# Patient Record
Sex: MALE | Race: WHITE | NOT HISPANIC OR LATINO | Employment: FULL TIME | URBAN - METROPOLITAN AREA
[De-identification: names, ages, dates, MRNs, and addresses within clinical notes are randomized per-mention and may not be internally consistent; named-entity substitution may affect disease eponyms.]

---

## 2017-07-19 ENCOUNTER — COMMUNICATION - HEALTHEAST (OUTPATIENT)
Dept: FAMILY MEDICINE | Facility: CLINIC | Age: 32
End: 2017-07-19

## 2017-07-19 DIAGNOSIS — F41.1 ANXIETY STATE: ICD-10-CM

## 2017-11-08 ENCOUNTER — COMMUNICATION - HEALTHEAST (OUTPATIENT)
Dept: FAMILY MEDICINE | Facility: CLINIC | Age: 32
End: 2017-11-08

## 2017-11-08 DIAGNOSIS — F41.1 ANXIETY STATE: ICD-10-CM

## 2018-05-01 ENCOUNTER — OFFICE VISIT - HEALTHEAST (OUTPATIENT)
Dept: FAMILY MEDICINE | Facility: CLINIC | Age: 33
End: 2018-05-01

## 2018-05-01 DIAGNOSIS — F41.1 ANXIETY STATE: ICD-10-CM

## 2018-05-01 DIAGNOSIS — Z80.8 FAMILY HX OF MELANOMA: ICD-10-CM

## 2018-05-01 DIAGNOSIS — K21.00 REFLUX ESOPHAGITIS: ICD-10-CM

## 2018-05-01 DIAGNOSIS — Z00.00 HEALTHCARE MAINTENANCE: ICD-10-CM

## 2018-05-01 LAB
CHOLEST SERPL-MCNC: 153 MG/DL
ERYTHROCYTE [DISTWIDTH] IN BLOOD BY AUTOMATED COUNT: 11.7 % (ref 11–14.5)
FASTING STATUS PATIENT QL REPORTED: YES
FASTING STATUS PATIENT QL REPORTED: YES
GLUCOSE BLD-MCNC: 106 MG/DL (ref 70–99)
HCT VFR BLD AUTO: 44.5 % (ref 40–54)
HDLC SERPL-MCNC: 34 MG/DL
HGB BLD-MCNC: 15 G/DL (ref 14–18)
LDLC SERPL CALC-MCNC: 107 MG/DL
MCH RBC QN AUTO: 30.1 PG (ref 27–34)
MCHC RBC AUTO-ENTMCNC: 33.8 G/DL (ref 32–36)
MCV RBC AUTO: 89 FL (ref 80–100)
PLATELET # BLD AUTO: 153 THOU/UL (ref 140–440)
PMV BLD AUTO: 9 FL (ref 7–10)
RBC # BLD AUTO: 5 MILL/UL (ref 4.4–6.2)
TRIGL SERPL-MCNC: 58 MG/DL
WBC: 6.1 THOU/UL (ref 4–11)

## 2018-05-01 ASSESSMENT — MIFFLIN-ST. JEOR: SCORE: 2067.51

## 2018-05-03 ENCOUNTER — COMMUNICATION - HEALTHEAST (OUTPATIENT)
Dept: FAMILY MEDICINE | Facility: CLINIC | Age: 33
End: 2018-05-03

## 2018-05-16 ENCOUNTER — COMMUNICATION - HEALTHEAST (OUTPATIENT)
Dept: ONCOLOGY | Facility: HOSPITAL | Age: 33
End: 2018-05-16

## 2019-01-08 ENCOUNTER — OFFICE VISIT - HEALTHEAST (OUTPATIENT)
Dept: FAMILY MEDICINE | Facility: CLINIC | Age: 34
End: 2019-01-08

## 2019-01-08 DIAGNOSIS — R05.9 COUGH: ICD-10-CM

## 2019-01-08 LAB
FLUAV AG SPEC QL IA: NORMAL
FLUBV AG SPEC QL IA: NORMAL

## 2019-01-08 ASSESSMENT — MIFFLIN-ST. JEOR: SCORE: 1999.48

## 2019-01-14 ENCOUNTER — RECORDS - HEALTHEAST (OUTPATIENT)
Dept: ADMINISTRATIVE | Facility: OTHER | Age: 34
End: 2019-01-14

## 2019-04-23 ENCOUNTER — COMMUNICATION - HEALTHEAST (OUTPATIENT)
Dept: FAMILY MEDICINE | Facility: CLINIC | Age: 34
End: 2019-04-23

## 2019-04-23 DIAGNOSIS — F41.1 ANXIETY STATE: ICD-10-CM

## 2019-05-07 ENCOUNTER — OFFICE VISIT - HEALTHEAST (OUTPATIENT)
Dept: FAMILY MEDICINE | Facility: CLINIC | Age: 34
End: 2019-05-07

## 2019-05-07 DIAGNOSIS — F41.1 ANXIETY STATE: ICD-10-CM

## 2019-05-07 DIAGNOSIS — Z00.00 ROUTINE GENERAL MEDICAL EXAMINATION AT A HEALTH CARE FACILITY: ICD-10-CM

## 2019-05-07 DIAGNOSIS — Z80.8 FAMILY HX OF MELANOMA: ICD-10-CM

## 2019-05-07 DIAGNOSIS — J02.9 ACUTE PHARYNGITIS, UNSPECIFIED ETIOLOGY: ICD-10-CM

## 2019-05-07 LAB
BASOPHILS # BLD AUTO: 0 THOU/UL (ref 0–0.2)
BASOPHILS NFR BLD AUTO: 0 % (ref 0–2)
CHOLEST SERPL-MCNC: 169 MG/DL
EOSINOPHIL # BLD AUTO: 0.1 THOU/UL (ref 0–0.4)
EOSINOPHIL NFR BLD AUTO: 2 % (ref 0–6)
ERYTHROCYTE [DISTWIDTH] IN BLOOD BY AUTOMATED COUNT: 11.6 % (ref 11–14.5)
FASTING STATUS PATIENT QL REPORTED: YES
FASTING STATUS PATIENT QL REPORTED: YES
GLUCOSE BLD-MCNC: 85 MG/DL (ref 70–99)
HCT VFR BLD AUTO: 45.1 % (ref 40–54)
HDLC SERPL-MCNC: 39 MG/DL
HGB BLD-MCNC: 14.9 G/DL (ref 14–18)
LDLC SERPL CALC-MCNC: 116 MG/DL
LYMPHOCYTES # BLD AUTO: 2 THOU/UL (ref 0.8–4.4)
LYMPHOCYTES NFR BLD AUTO: 36 % (ref 20–40)
MCH RBC QN AUTO: 29.3 PG (ref 27–34)
MCHC RBC AUTO-ENTMCNC: 33.1 G/DL (ref 32–36)
MCV RBC AUTO: 89 FL (ref 80–100)
MONOCYTES # BLD AUTO: 0.4 THOU/UL (ref 0–0.9)
MONOCYTES NFR BLD AUTO: 7 % (ref 2–10)
NEUTROPHILS # BLD AUTO: 3 THOU/UL (ref 2–7.7)
NEUTROPHILS NFR BLD AUTO: 55 % (ref 50–70)
PLATELET # BLD AUTO: 138 THOU/UL (ref 140–440)
PMV BLD AUTO: 8.8 FL (ref 7–10)
RBC # BLD AUTO: 5.09 MILL/UL (ref 4.4–6.2)
TRIGL SERPL-MCNC: 68 MG/DL
TSH SERPL DL<=0.005 MIU/L-ACNC: 1.99 UIU/ML (ref 0.3–5)
WBC: 5.5 THOU/UL (ref 4–11)

## 2019-05-07 ASSESSMENT — MIFFLIN-ST. JEOR: SCORE: 2017.9

## 2019-06-10 ENCOUNTER — OFFICE VISIT - HEALTHEAST (OUTPATIENT)
Dept: ONCOLOGY | Facility: HOSPITAL | Age: 34
End: 2019-06-10

## 2019-06-10 DIAGNOSIS — Z80.0 FAMILY HISTORY OF PANCREATIC CANCER: ICD-10-CM

## 2019-06-10 DIAGNOSIS — Z80.41 FAMILY HISTORY OF OVARIAN CANCER: ICD-10-CM

## 2019-06-10 DIAGNOSIS — Z80.0 FAMILY HISTORY OF COLON CANCER: ICD-10-CM

## 2019-06-10 DIAGNOSIS — Z80.8 FAMILY HISTORY OF MELANOMA: ICD-10-CM

## 2019-07-08 ENCOUNTER — COMMUNICATION - HEALTHEAST (OUTPATIENT)
Dept: ADMINISTRATIVE | Facility: HOSPITAL | Age: 34
End: 2019-07-08

## 2019-07-11 ENCOUNTER — COMMUNICATION - HEALTHEAST (OUTPATIENT)
Dept: ADMINISTRATIVE | Facility: HOSPITAL | Age: 34
End: 2019-07-11

## 2019-07-18 ENCOUNTER — COMMUNICATION - HEALTHEAST (OUTPATIENT)
Dept: ADMINISTRATIVE | Facility: HOSPITAL | Age: 34
End: 2019-07-18

## 2019-07-31 ENCOUNTER — OFFICE VISIT - HEALTHEAST (OUTPATIENT)
Dept: ONCOLOGY | Facility: HOSPITAL | Age: 34
End: 2019-07-31

## 2019-07-31 DIAGNOSIS — Z80.8 FAMILY HISTORY OF MELANOMA: ICD-10-CM

## 2019-07-31 DIAGNOSIS — Z80.0 FAMILY HISTORY OF PANCREATIC CANCER: ICD-10-CM

## 2019-07-31 DIAGNOSIS — Z80.0 FAMILY HISTORY OF COLON CANCER: ICD-10-CM

## 2019-07-31 DIAGNOSIS — Z80.41 FAMILY HISTORY OF OVARIAN CANCER: ICD-10-CM

## 2020-02-04 ENCOUNTER — COMMUNICATION - HEALTHEAST (OUTPATIENT)
Dept: ONCOLOGY | Facility: HOSPITAL | Age: 35
End: 2020-02-04

## 2020-02-04 DIAGNOSIS — Z15.03 MONOALLELIC MUTATION OF CHEK2 GENE IN MALE PATIENT: ICD-10-CM

## 2020-02-04 DIAGNOSIS — Z15.09 MONOALLELIC MUTATION OF CHEK2 GENE IN MALE PATIENT: ICD-10-CM

## 2020-02-04 DIAGNOSIS — Z15.01 MONOALLELIC MUTATION OF CHEK2 GENE IN MALE PATIENT: ICD-10-CM

## 2020-02-04 DIAGNOSIS — Z80.8 FAMILY HISTORY OF THYROID CANCER: ICD-10-CM

## 2020-02-04 DIAGNOSIS — Z15.89 MONOALLELIC MUTATION OF CHEK2 GENE IN MALE PATIENT: ICD-10-CM

## 2020-02-04 DIAGNOSIS — Z80.0 FAMILY HISTORY OF PANCREATIC CANCER: ICD-10-CM

## 2020-02-04 DIAGNOSIS — Z80.8 FAMILY HISTORY OF MELANOMA: ICD-10-CM

## 2020-02-07 ENCOUNTER — COMMUNICATION - HEALTHEAST (OUTPATIENT)
Dept: ADMINISTRATIVE | Facility: HOSPITAL | Age: 35
End: 2020-02-07

## 2020-03-04 ENCOUNTER — OFFICE VISIT - HEALTHEAST (OUTPATIENT)
Dept: FAMILY MEDICINE | Facility: CLINIC | Age: 35
End: 2020-03-04

## 2020-03-04 ENCOUNTER — COMMUNICATION - HEALTHEAST (OUTPATIENT)
Dept: FAMILY MEDICINE | Facility: CLINIC | Age: 35
End: 2020-03-04

## 2020-03-04 DIAGNOSIS — R14.0 BLOATING: ICD-10-CM

## 2020-03-04 DIAGNOSIS — F41.1 ANXIETY STATE: ICD-10-CM

## 2020-03-04 DIAGNOSIS — E80.6 HYPERBILIRUBINEMIA: ICD-10-CM

## 2020-03-04 DIAGNOSIS — Z00.00 ROUTINE GENERAL MEDICAL EXAMINATION AT A HEALTH CARE FACILITY: ICD-10-CM

## 2020-03-04 DIAGNOSIS — R10.84 ABDOMINAL PAIN, GENERALIZED: ICD-10-CM

## 2020-03-04 DIAGNOSIS — Z80.0 FAMILY HISTORY OF MALIGNANT NEOPLASM OF GASTROINTESTINAL TRACT: ICD-10-CM

## 2020-03-04 LAB
ALBUMIN SERPL-MCNC: 4.3 G/DL (ref 3.5–5)
ALP SERPL-CCNC: 51 U/L (ref 45–120)
ALT SERPL W P-5'-P-CCNC: 20 U/L (ref 0–45)
ANION GAP SERPL CALCULATED.3IONS-SCNC: 6 MMOL/L (ref 5–18)
AST SERPL W P-5'-P-CCNC: 18 U/L (ref 0–40)
BILIRUB SERPL-MCNC: 1.5 MG/DL (ref 0–1)
BUN SERPL-MCNC: 11 MG/DL (ref 8–22)
CALCIUM SERPL-MCNC: 9.5 MG/DL (ref 8.5–10.5)
CHLORIDE BLD-SCNC: 103 MMOL/L (ref 98–107)
CHOLEST SERPL-MCNC: 177 MG/DL
CO2 SERPL-SCNC: 29 MMOL/L (ref 22–31)
CREAT SERPL-MCNC: 0.9 MG/DL (ref 0.7–1.3)
ERYTHROCYTE [DISTWIDTH] IN BLOOD BY AUTOMATED COUNT: 11.5 % (ref 11–14.5)
FASTING STATUS PATIENT QL REPORTED: YES
GFR SERPL CREATININE-BSD FRML MDRD: >60 ML/MIN/1.73M2
GLUCOSE BLD-MCNC: 97 MG/DL (ref 70–125)
HCT VFR BLD AUTO: 45.4 % (ref 40–54)
HDLC SERPL-MCNC: 36 MG/DL
HGB BLD-MCNC: 15.6 G/DL (ref 14–18)
LDLC SERPL CALC-MCNC: 127 MG/DL
MCH RBC QN AUTO: 30.8 PG (ref 27–34)
MCHC RBC AUTO-ENTMCNC: 34.4 G/DL (ref 32–36)
MCV RBC AUTO: 89 FL (ref 80–100)
PLATELET # BLD AUTO: 140 THOU/UL (ref 140–440)
PMV BLD AUTO: 8.6 FL (ref 7–10)
POTASSIUM BLD-SCNC: 4.5 MMOL/L (ref 3.5–5)
PROT SERPL-MCNC: 7.1 G/DL (ref 6–8)
RBC # BLD AUTO: 5.07 MILL/UL (ref 4.4–6.2)
SODIUM SERPL-SCNC: 138 MMOL/L (ref 136–145)
TRIGL SERPL-MCNC: 68 MG/DL
WBC: 5.5 THOU/UL (ref 4–11)

## 2020-03-04 ASSESSMENT — ANXIETY QUESTIONNAIRES
4. TROUBLE RELAXING: NOT AT ALL
7. FEELING AFRAID AS IF SOMETHING AWFUL MIGHT HAPPEN: NOT AT ALL
2. NOT BEING ABLE TO STOP OR CONTROL WORRYING: NOT AT ALL
GAD7 TOTAL SCORE: 1
3. WORRYING TOO MUCH ABOUT DIFFERENT THINGS: NOT AT ALL
5. BEING SO RESTLESS THAT IT IS HARD TO SIT STILL: NOT AT ALL
IF YOU CHECKED OFF ANY PROBLEMS ON THIS QUESTIONNAIRE, HOW DIFFICULT HAVE THESE PROBLEMS MADE IT FOR YOU TO DO YOUR WORK, TAKE CARE OF THINGS AT HOME, OR GET ALONG WITH OTHER PEOPLE: NOT DIFFICULT AT ALL
6. BECOMING EASILY ANNOYED OR IRRITABLE: NOT AT ALL
1. FEELING NERVOUS, ANXIOUS, OR ON EDGE: SEVERAL DAYS

## 2020-03-04 ASSESSMENT — MIFFLIN-ST. JEOR: SCORE: 2038.03

## 2020-03-04 ASSESSMENT — PATIENT HEALTH QUESTIONNAIRE - PHQ9: SUM OF ALL RESPONSES TO PHQ QUESTIONS 1-9: 0

## 2020-03-11 ENCOUNTER — COMMUNICATION - HEALTHEAST (OUTPATIENT)
Dept: FAMILY MEDICINE | Facility: CLINIC | Age: 35
End: 2020-03-11

## 2020-03-11 ENCOUNTER — AMBULATORY - HEALTHEAST (OUTPATIENT)
Dept: LAB | Facility: CLINIC | Age: 35
End: 2020-03-11

## 2020-03-11 DIAGNOSIS — E80.6 HYPERBILIRUBINEMIA: ICD-10-CM

## 2020-03-11 LAB
ALBUMIN SERPL-MCNC: 4.2 G/DL (ref 3.5–5)
ALP SERPL-CCNC: 55 U/L (ref 45–120)
ALT SERPL W P-5'-P-CCNC: 22 U/L (ref 0–45)
ANION GAP SERPL CALCULATED.3IONS-SCNC: 9 MMOL/L (ref 5–18)
AST SERPL W P-5'-P-CCNC: 18 U/L (ref 0–40)
BILIRUB DIRECT SERPL-MCNC: 0.3 MG/DL
BILIRUB INDIRECT SERPL-MCNC: 1 MG/DL (ref 0–1)
BILIRUB SERPL-MCNC: 1.3 MG/DL (ref 0–1)
BILIRUB SERPL-MCNC: 1.3 MG/DL (ref 0–1)
BUN SERPL-MCNC: 11 MG/DL (ref 8–22)
CALCIUM SERPL-MCNC: 9.6 MG/DL (ref 8.5–10.5)
CHLORIDE BLD-SCNC: 104 MMOL/L (ref 98–107)
CO2 SERPL-SCNC: 26 MMOL/L (ref 22–31)
CREAT SERPL-MCNC: 0.91 MG/DL (ref 0.7–1.3)
GFR SERPL CREATININE-BSD FRML MDRD: >60 ML/MIN/1.73M2
GLUCOSE BLD-MCNC: 92 MG/DL (ref 70–125)
POTASSIUM BLD-SCNC: 4.6 MMOL/L (ref 3.5–5)
PROT SERPL-MCNC: 6.9 G/DL (ref 6–8)
SODIUM SERPL-SCNC: 139 MMOL/L (ref 136–145)

## 2020-03-31 ENCOUNTER — COMMUNICATION - HEALTHEAST (OUTPATIENT)
Dept: ADMINISTRATIVE | Facility: HOSPITAL | Age: 35
End: 2020-03-31

## 2020-07-06 ENCOUNTER — COMMUNICATION - HEALTHEAST (OUTPATIENT)
Dept: FAMILY MEDICINE | Facility: CLINIC | Age: 35
End: 2020-07-06

## 2020-07-06 DIAGNOSIS — R49.0 HOARSENESS OF VOICE: ICD-10-CM

## 2020-07-15 ENCOUNTER — AMBULATORY - HEALTHEAST (OUTPATIENT)
Dept: LAB | Facility: CLINIC | Age: 35
End: 2020-07-15

## 2020-07-15 DIAGNOSIS — R49.0 HOARSENESS: ICD-10-CM

## 2020-07-15 LAB
C REACTIVE PROTEIN LHE: <0.1 MG/DL (ref 0–0.8)
ERYTHROCYTE [SEDIMENTATION RATE] IN BLOOD BY WESTERGREN METHOD: 0 MM/HR (ref 0–15)
RHEUMATOID FACT SERPL-ACNC: <15 IU/ML (ref 0–30)

## 2020-07-17 LAB — ANA SER QL: 0.3 U

## 2020-07-21 LAB
SS-A/RO AUTOANTIBODIES - HISTORICAL: 0 EU
SS-B/LA AUTOANTIBODIES - HISTORICAL: 0 EU

## 2020-11-04 ENCOUNTER — OFFICE VISIT - HEALTHEAST (OUTPATIENT)
Dept: ONCOLOGY | Facility: HOSPITAL | Age: 35
End: 2020-11-04

## 2020-11-04 DIAGNOSIS — Z15.09 MONOALLELIC MUTATION OF CHEK2 GENE IN MALE PATIENT: ICD-10-CM

## 2020-11-04 DIAGNOSIS — Z15.03 MONOALLELIC MUTATION OF CHEK2 GENE IN MALE PATIENT: ICD-10-CM

## 2020-11-04 DIAGNOSIS — Z15.89 MONOALLELIC MUTATION OF CHEK2 GENE IN MALE PATIENT: ICD-10-CM

## 2020-11-04 DIAGNOSIS — Z15.01 MONOALLELIC MUTATION OF CHEK2 GENE IN MALE PATIENT: ICD-10-CM

## 2020-12-07 ENCOUNTER — RECORDS - HEALTHEAST (OUTPATIENT)
Dept: ADMINISTRATIVE | Facility: OTHER | Age: 35
End: 2020-12-07

## 2020-12-11 ENCOUNTER — RECORDS - HEALTHEAST (OUTPATIENT)
Dept: HEALTH INFORMATION MANAGEMENT | Facility: CLINIC | Age: 35
End: 2020-12-11

## 2020-12-18 ENCOUNTER — RECORDS - HEALTHEAST (OUTPATIENT)
Dept: ADMINISTRATIVE | Facility: OTHER | Age: 35
End: 2020-12-18

## 2020-12-18 LAB
ALT SERPL W/O P-5'-P-CCNC: 28 U/L (ref 0–78)
AST SERPL-CCNC: 19 U/L (ref 0–37)
CREAT SERPL-MCNC: 1.01 MG/DL (ref 0.7–1.3)
GFR ESTIMATE EXT - HISTORICAL: >60 ML/MIN/1.73M2
GFR ESTIMATE, IF BLACK EXT - HISTORICAL: >60 ML/MIN/1.73M2

## 2020-12-21 ENCOUNTER — RECORDS - HEALTHEAST (OUTPATIENT)
Dept: ADMINISTRATIVE | Facility: OTHER | Age: 35
End: 2020-12-21

## 2020-12-23 ENCOUNTER — RECORDS - HEALTHEAST (OUTPATIENT)
Dept: HEALTH INFORMATION MANAGEMENT | Facility: CLINIC | Age: 35
End: 2020-12-23

## 2020-12-23 ENCOUNTER — RECORDS - HEALTHEAST (OUTPATIENT)
Dept: ADMINISTRATIVE | Facility: OTHER | Age: 35
End: 2020-12-23

## 2021-02-10 ENCOUNTER — RECORDS - HEALTHEAST (OUTPATIENT)
Dept: ADMINISTRATIVE | Facility: OTHER | Age: 36
End: 2021-02-10

## 2021-02-13 ENCOUNTER — RECORDS - HEALTHEAST (OUTPATIENT)
Dept: ADMINISTRATIVE | Facility: OTHER | Age: 36
End: 2021-02-13

## 2021-05-27 ASSESSMENT — PATIENT HEALTH QUESTIONNAIRE - PHQ9: SUM OF ALL RESPONSES TO PHQ QUESTIONS 1-9: 0

## 2021-05-28 ASSESSMENT — ANXIETY QUESTIONNAIRES: GAD7 TOTAL SCORE: 1

## 2021-05-28 NOTE — PROGRESS NOTES
Assessment/Plan:        Healthcare Maintenance Exam    Fasting labs pending  Immunizations up to date  Healthy lifestyle modifications discussed  Discussed self testicular exams  The following high BMI interventions were performed this visit: encouragement to exercise and weight monitoring      1. Routine general medical examination at a health care facility  Blood work as below.  Given his unintentional 10 pound weight loss over the last year does not sound overly concerning.  - Thyroid Cascade  - Lipid Cascade  - HM1(CBC and Differential)  - Glucose  - HM1 (CBC with Diff)    2. Acute pharyngitis, unspecified etiology  Referral to ENT will be placed.  This potentially could be due to reflux but he has increased his Prilosec to daily use for the last few weeks and not really noticed much of a difference.  He might benefit from visualization of the vocal cords  - Ambulatory referral to ENT    3. Family hx of melanoma  He follows with the dermatologist.  He would like to see a  given his strong family history of cancers.  - Ambulatory referral to Genetics    4. Anxiety state  Refill Celexa sent to the pharmacy.  He is doing well with this medication.  - citalopram (CELEXA) 10 MG tablet; Take 1 tablet (10 mg total) by mouth daily.  Dispense: 90 tablet; Refill: 4           Subjective:      Erwin Mauro is a 33 y.o. male who presents for an annual exam. Concerns are as follows:    Family history of cancer: Patient's mother had a history of melanoma, stomach cancer, skin cancer.  The patient follows with a dermatologist.  He is hopeful to see a  to discuss if there is any further screening that needs to be done.  This referral was given last year but he was unable to follow-up.  He is hopeful to renew this referral.    Pharyngitis/laryngitis: Patient notes that he was sick several times this winter.  Since February he has not been sick but notes that after a long day of speaking his voice will  become very hoarse.  He is hopeful to see an ENT physician to further evaluate.  He does have a history of reflux and started taking Prilosec on a more regular basis but notes that that did not really help that much.    Lastly, he wants to discuss a 10 pound weight loss over the last year.  This is been unintentional.  He states that he usually has a difficult time losing weight.  He does not have any night sweats.  No other specific concerns.  He does note that he has been watching portion sizes and was very active last summer with restoring his cabin.    He has a 5-year-old and 3-year-old at home.  Both boys.    Healthy Habits:   Regular Exercise: Yes  Sunscreen Use: Yes  Healthy Diet: Yes  Dental Visits Regularly: Yes  Seat Belt: Yes  Sexually active: Yes  Self Testicular Exam Monthly:Yes  Colonoscopy: N/A  Lipid Profile: Yes  Glucose Screen: Yes      Immunization History   Administered Date(s) Administered     Influenza, seasonal,quad inj 6-35 mos 10/17/2014     Tdap 12/22/2006, 06/10/2016     Immunization status: up to date and documented.      Current Outpatient Medications   Medication Sig Dispense Refill     citalopram (CELEXA) 10 MG tablet TAKE 1 TABLET BY MOUTH EVERY DAY 90 tablet 2     omeprazole (PRILOSEC) 20 MG capsule Take 1 capsule (20 mg total) by mouth daily as needed (indigestion/heartburn). 90 capsule 3     No current facility-administered medications for this visit.      Past Medical History:   Diagnosis Date     Anxiety      GERD (gastroesophageal reflux disease)      Past Surgical History:   Procedure Laterality Date     lasix       MOLE REMOVAL       Patient has no known allergies.  Family History   Problem Relation Age of Onset     Cancer Mother         skin, stomach, pancreatic     Diabetes Mother      Heart disease Maternal Grandmother      Diabetes Maternal Grandmother      Diabetes Maternal Grandfather      Social History     Socioeconomic History     Marital status:      Spouse  "name: Not on file     Number of children: Not on file     Years of education: Not on file     Highest education level: Not on file   Occupational History     Not on file   Social Needs     Financial resource strain: Not on file     Food insecurity:     Worry: Not on file     Inability: Not on file     Transportation needs:     Medical: Not on file     Non-medical: Not on file   Tobacco Use     Smoking status: Never Smoker     Smokeless tobacco: Never Used   Substance and Sexual Activity     Alcohol use: Not on file     Drug use: Not on file     Sexual activity: Yes   Lifestyle     Physical activity:     Days per week: Not on file     Minutes per session: Not on file     Stress: Not on file   Relationships     Social connections:     Talks on phone: Not on file     Gets together: Not on file     Attends Evangelical service: Not on file     Active member of club or organization: Not on file     Attends meetings of clubs or organizations: Not on file     Relationship status: Not on file     Intimate partner violence:     Fear of current or ex partner: Not on file     Emotionally abused: Not on file     Physically abused: Not on file     Forced sexual activity: Not on file   Other Topics Concern     Not on file   Social History Narrative     Not on file       Review of Systems  General:  Denies problems  Eyes:  Denies problems  Ears/Nose/Throat:  Denies problems  Cardiovascular:  Denies problems  Respiratory:  Denies problems  Gastrointestinal:  Denies problems  Genitourinary:  Denies problems  Musculoskeletal:  Denies problems  Skin:  Denies problems  Neurologic:  Denies problems  Psychiatric:  Denies problems  Endocrine:  Denies problems  Heme/Lymphatic:  Denies problems  Allergic/Immunologic:  Denies problems         Objective:         Vitals:    05/07/19 0803   BP: 130/62   Pulse: 68   Resp: 12   Temp: 98.3  F (36.8  C)   TempSrc: Oral   Weight: (!) 236 lb 1 oz (107.1 kg)   Height: 5' 11\" (1.803 m)       Physical " Exam:  General Appearance: Alert, cooperative, no distress, appears stated age  Head: Normocephalic, without obvious abnormality, atraumatic  Eyes: PERRL, conjunctiva/corneas clear, EOM's intact  Ears: Normal TM's and external ear canals, both ears   Nose:Nares normal, septum midline,mucosa normal, no drainage   Throat:Lips, mucosa, and tongue normal; teeth and gums normal  Neck: Supple, symmetrical, trachea midline, no adenopathy;  thyroid: not enlarged, symmetric, no tenderness/mass/nodules  Back: Symmetric, no curvature, ROM normal, no CVA tenderness   Lungs: Clear to auscultation bilaterally, respirations unlabored  Chest: no visible abnormalities.  Heart: Regular rate and rhythm, S1 and S2 normal, no murmur, rub, or gallop,   Abdomen: Soft, non-tender, bowel sounds active all four quadrants,  no masses, no organomegaly  : Patient deferred.  No testicular concerns   extremities: Extremities normal, atraumatic, no cyanosis or edema  Skin: Skin color, texture, turgor normal, no rashes or lesions  Lymph nodes: Cervical, supraclavicular, and axillary nodes normal  Neurologic: Normal

## 2021-05-28 NOTE — TELEPHONE ENCOUNTER
Refill Approved    Rx renewed per Medication Renewal Policy. Medication was last renewed on 5/1/18.    Alice Martins, [facility] Triage/Med Refill 4/25/2019     Requested Prescriptions   Pending Prescriptions Disp Refills     citalopram (CELEXA) 10 MG tablet [Pharmacy Med Name: CITALOPRAM HBR 10 MG TABLET] 90 tablet 3     Sig: TAKE 1 TABLET BY MOUTH EVERY DAY       SS[facility] Refill Protocol  Passed - 4/23/2019  4:40 AM        Passed - PCP or prescribing provider visit in last year     Last office visit with prescriber/PCP: 6/10/2016 Tenisha Manrique MD OR same dept: 1/8/2019 Ivis Hill MD OR same specialty: 1/8/2019 Ivis Hill MD  Last physical: 5/1/2018 Last MTM visit: Visit date not found   Next visit within 3 mo: Visit date not found  Next physical within 3 mo: Visit date not found  Prescriber OR PCP: Tenisha Manrique MD  Last diagnosis associated with med order: 1. Anxiety state  - citalopram (CELEXA) 10 MG tablet [Pharmacy Med Name: CITALOPRAM HBR 10 MG TABLET]; TAKE 1 TABLET BY MOUTH EVERY DAY  Dispense: 90 tablet; Refill: 3    If protocol passes may refill for 12 months if within 3 months of last provider visit (or a total of 15 months).

## 2021-05-29 NOTE — PROGRESS NOTES
6/10/2019    Referring Provider: Tenisha Manrique,*    Presenting Information:   I met with Erwin Mauro today for genetic counseling at the [facility] at the [facility] to discuss his family history of melanoma.  He is here today to review this history, cancer screening recommendations, and available genetic testing options.    Personal History:  Erwin is a 34 y.o. male. He has a history of a basal cell carcinoma at age 31 removed from right beneath his nose.      He has not yet had a colonoscopy due to his age.  He follows with dermatology every 6 months. He reports that he has about 3 moles removed at each visit; most are precancerous. In total, he reports that he has had about 20 moles removed. He does not regularly do any other cancer screening at this time.  Erwin reported a smoking history from ages [age] at which time, he quit. HE reports social alcohol use.     Family History: (Please see scanned pedigree for detailed family history information)    Brad's mother passed away at age 60 with a history of melanoma at age 42, Hodgkin's lymphoma at age 45, non-Hodgkin's lymphoma at age 50, thyroid cancer in her early 50's, and pancreatic cancer at age 57.     One of Brad's maternal aunts has a history of a basal cell carcinoma.     Brad's maternal grandmother passed away at age 83 with a history of ovarian cancer.     Brad's maternal grandfather's brother passed away in his early 70's with a history of colon cancer.    Brad's paternal grandfather, age 87, has a history of a basal cell carcinoma.    Brad's paternal grandmother's brother passed away in his early 80's with a history of colon cancer.    His maternal ethnicity is [place], [place], [place] . His paternal ethnicity is [place], [place], and [place].  There is no known Ashkenazi [place]atsdin ancestry on either side of his family. There is no reported consanguinity.    Discussion:    Erwin's family history of  pancreatic cancer and ovarian cancer is suggestive of a hereditary cancer syndrome.    We reviewed the features of sporadic, familial, and hereditary cancers. In looking at Erwin's family history, it is possible that a cancer susceptibility gene is present due to his mother's multiple primary cancers including melanoma and pancreatic cancer as well as his maternal grandmother's history of ovarian cancer.    We discussed the natural history and genetics of hereditary melanoma, pancreatic cancer, and ovarian cancer. A detailed handout the information we discussed was provided to Erwin at the end of our appointment today. Topics included: inheritance pattern, cancer risks, cancer screening recommendations, and also risks, benefits and limitations of testing.    One hereditary cancer syndrome that increases the risk for melanoma is Familial Multiple Mole Melanoma Syndrome (FAMMM). Familial Atypical Multiple Mole Melanoma Syndrome (FAMMM) is caused by a single mutation in the CDKN2A gene. The lifetime risk for melanoma is between 50-75%. Some studies indicate that these risks may vary, due to different factors. Individuals with FAMMM typically have many moles (more than 50), which can be atypical. People with FAMMM have increased risks for pancreatic cancer, and possibly other cancers. The exact risk of pancreatic cancer can depend on a person s specific CDKN2A mutation, but has been reported as about 17% by age 75 by one larger study (PMID [id number]). We discussed screening for melanoma (frequent skin exams) and pancreatic cancer (endoscopic ultrasonography (EUS) and/or MRI/magnetic resonance.  Mutations in the BAP1 gene are associated with a diagnosis of BAP1 Tumor Predisposition Syndrome.  Associated cancers/tumors include atypical Spitz tumors (benign skin tumors), uveal (eye) melanoma, mesothelioma, cutaneous (skin) melanoma, and kidney cancer. There may also be increased risks for other cancers, including basal  cell carcinoma, breast, thyroid, meningioma, and neuroendocrine tumors. Currently there are no estimates regarding the prevalence or lifetime risk of these cancers.  Another potential cause of hereditary melanoma is Hereditary Breast and Ovarian Cancer (HBOC) syndrome, which is caused by mutations in the gene BRCA2. BRCA2 is a gene that increases the risk for breast and ovarian cancers, among others. Women who inherit a BRCA2 mutation have a 50 to 85% lifetime risk of breast cancer and a 15 to 45% lifetime risk of ovarian cancer. This is higher than the general population lifetime risks of 12% for breast cancer and less than 2% for ovarian cancer. Men with BRCA2 gene mutations have up to a 7% risk of breast cancer and 20% risk of prostate cancer. Other cancers, such as pancreatic cancer and melanoma, have also been associated with BRCA mutations.  Given the family history of pancreatic cancer and ovarian cancer and colon cancer in his more distant relatives, we also discussed Tristan syndrome. Tristan syndrome can be caused by a mutation in one of five genes:  MLH1, MSH2, MSH6, PMS2, and EPCAM.  Tristan syndrome may present with polyps, but typically a small number.  The highest cancer risks associated with Tristan syndrome include colon cancer, endometrial/uterine cancer, gastric cancer, and ovarian cancer.    Based on his family history, Erwin meets current National Comprehensive Cancer Network (NCCN) criteria for genetic testing of BRCA1 and BRCA2.      We discussed that there are additional genes that could cause increased risk for pancreatic cancer, ovarian cancer, and melanomas. As many of these genes present with overlapping features in a family and accurate cancer risk cannot always be established based upon the pedigree analysis alone, it would be reasonable for Erwin to consider panel genetic testing to analyze multiple genes at once. After our discussion, Erwin opted to proceed with genetic testing via the  CustomNext-Cancer panel through [employer] [facility] (CancerNext + MelanomaNext).  Genetic testing is available for 37 genes associated with hereditary cancer: CustomNext-Cancer (APC, BELLE, BAP1, BARD1, BRCA1, BRCA2, BRIP1, BMPR1A, CDH1, CDK4, CDKN2A, CHEK2, DICER1, EPCAM, GREM1, HOXB13, MITF, MLH1, MRE11A, MSH2, MSH6, MUTYH, NBN, NF1, PALB2, PMS2, POLD1, POLE, PTEN, RAD50, RAD51C, RAD51D, RB1, SMAD4, SMARCA4, STK11, and TP53).  We discussed that many of the genes in the CancerNext panel are associated with specific hereditary cancer syndromes and published management guidelines: Hereditary Breast and Ovarian Cancer syndrome (BRCA1, BRCA2), Tristan syndrome (MLH1, MSH2, MSH6, PMS2, EPCAM), Familial Adenomatous Polyposis (APC), Hereditary Diffuse Gastric Cancer (CDH1), Familial Atypical Multiple Mole Melanoma syndrome (CDK4, CDKN2A), Juvenile Polyposis syndrome (BMPR1A, SMAD4), Cowden syndrome (PTEN), Li Fraumeni syndrome (TP53), Peutz-Jeghers syndrome (STK11), MUTYH Associated Polyposis (MUTYH), Neurofibromatosis type 1 (NF1), BAP1-tumor predisposition syndrome (BAP1), and Hereditary Retinoblastoma (RB1).  The BELLE, BRIP1, CHEK2, GREM1, MITF, NBN, PALB2, POLD1, POLE, RAD51C, and RAD51D genes are associated with increased cancer risk and have published management guidelines for certain cancers.    The remaining genes (BARD1, DICER1, HOXB13, MRE11A, RAD50, and SMARCA4) are associated with increased cancer risk and may allow us to make medical recommendations when mutations are identified.    Consent was obtained and genetic testing for CancerNext was sent to [email or web address]. Turn around time: 4 weeks    Medical Management: For Erwin, we reviewed that the information from genetic testing may determine:    additional cancer screening for which Erwin may qualify (i.e. more frequent colonoscopies, more frequent dermatologic exams, pancreatic cancer screening etc.),     and targeted chemotherapies for Erwin  if he  were to develop certain cancers in the future (i.e. immunotherapy for individuals with Tristan syndrome, PARP inhibitors, etc.).     These recommendations and possible targeted chemotherapies will be discussed in detail once genetic testing is completed.     Plan:  1) Today Erwin elected to proceed with CustomNext-Cancer genetic testing (37 genes) through [facility].  2) This information should be available in approximately 4 weeks.  3) Erwin will be contacted by our scheduling department to set up a result disclosure appointment either in person or over the phone.     Face to face time: 45 minutes    Nini Leonard MS, [facility]  Licensed Genetic Counselor  [facility]  [phone]

## 2021-05-30 NOTE — PROGRESS NOTES
7/31/2019    Referring Provider: Dr. Manrique    Presenting Information:   I spoke with Erwin over the phone today to discuss his genetic testing results. His blood was drawn on 06/10/2019 and we ordered [facility] (CancerNext +MelanomaNext) testing from [facility]. This testing was done because of his family history of pancreatic cancer, thyroid cancer, ovarian cancer and melanoma.     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 We discussed that this mutation is associated with an increased risk for female breast and colon cancer. We discussed the impact of this testing on Erwin in detail.     Of note, Erwin tested negative for mutations in the following genes by sequencing and deletion/duplication analysis: Erwin is negative for mutations in the APC, BELLE, BAP1, BARD1, BRCA1, BRCA2, BRIP1, BMPR1A, CDH1, CDK4, CDKN2A, DICER1, EPCAM, HOXB13, GREM1, MLH1, MRE11A, MSH2, MSH6, MUTYH, NBN, NF1, PALB2, PMS2, POLD1, POLE, PTEN, RAD50, RAD51C, RAD51D, RB1, SMAD4, SMARCA4, STK11, and TP53 genes. No mutations were found in any of the remaining 36 genes analyzed. This test involved sequencing and deletion/duplication analysis of all genes with the exception of EPCAM and GREM1 (deletions only).    Testing did not detect an identifiable mutation associated with Hereditary Breast and Ovarian Cancer syndrome (BRCA1, BRCA2), Tristan syndrome (MLH1, MSH2, MSH6, PMS2, EPCAM), Familial Adenomatous Polyposis (APC), Hereditary Diffuse Gastric Cancer (CDH1), Familial Atypical Multiple Mole Melanoma syndrome (CDK4, CDKN2A), Juvenile Polyposis syndrome (BMPR1A, SMAD4), Cowden syndrome (PTEN), Li Fraumeni syndrome (TP53), Peutz-Jeghers syndrome (STK11), MUTYH Associated Polyposis (MUTYH), or Neurofibromatosis type 1 (NF1).  We reviewed the autosomal dominant inheritance of these genes. Erwin cannot pass on a mutation in any of these genes to  "his children based on this test result. Mutations in these genes do not skip generations.      A copy of the test report can be found in the Media tab and named \"Genetics-Scan [facility]\". The report is scanned in as a linked document.    Cancer Risks:    Mutations in the CHEK2 gene are associated with a moderate risk of breast and colon cancer.  Of note, the 1100delC mutation in the CHEK2 gene is common in  individuals.    The lifetime breast cancer risk for women who carry one CHEK2 mutation is approximately 24%-36%, compared to the lifetime population risk of breast cancer of 12%.    - There is also an increased risk of a second breast cancer in women with a mutation in the CHEK2 gene, though the exact risk is unknown at this time.       The risk of colon cancer may be twice as high as the general population risk of colon cancer of 5%.     We also discussed the possible association of CHEK2 mutations with increased risk for prostate, melanoma, thyroid, and other cancers, however data is still limited in this area. No confirmed risk numbers are available for these additional cancers, though they have been reported in families that have a CHEK2 mutation.    Of note, we reviewed that the CHEK2 gene is currently considered a moderate-risk gene. This means that mutations in this gene increase the risk for certain cancers, but are unlikely to be the single cause for an individual's cancer. There are likely other genetic and/or environmental risk factors that, in combination with a CHEK2 gene mutation, cause cancer.    Cancer Screening and Prevention:  The following screening is recommended for individuals who have a mutation in the CHEK2 gene, per current National Comprehensive Cancer Network (NCCN) guidelines.    Women with CHEK2 mutations qualify for high risk breast screening.  o High risk breast cancer screening recommendations include annual mammograms and annual breast MRI's, alternating every 6 months.  "   o It is typically recommended that this screening begin at age 40, though this can be discussed in more detail with a woman's medical providers.    Recent guidelines recommend colon cancer screening in individuals who have a mutation in the CHEK2 gene.   o Colonoscopies are done every 5 years, beginning at age 40 (or based on family history of colon cancer).      There are currently no other specific cancer screening guidelines for other cancers potentially associated with a CHEK2 mtuation. As such, additional screening should be based on family history.    Other screening based on Erwin's personal and family history:    Due to the family history of melanoma, Erwin remains at increased risk for developing melanoma. According to the American Cancer Society, Erwin is encouraged to speak to his primary care provider about having regular skin exams and safe skin practices (i.e. sunscreen, self skin exams, limited sun exposure, etc.).    We discussed that Erwin likely has some increased risk for pancreatic cancer given his mother's history, but routine screening is typically not recommended based on this family history alone. Should an additional family be diagnosed with pancreatic cancer, screening recommendations may change for Erwin and he should contact me with any changes.  Erwin is encouraged to discuss this history with his care providers.      We discussed that Erwin likely has some increased risk for thyroid cancer given his mother's history, but routine screening is typically not recommended.  Erwin is encouraged to discuss this history with his care providers.      Due to Erwin's maternal grandmother's history of ovarian cancer, close female relatives of the family member who had ovarian cancer remain at slightly increased risk for ovarian cancer. We discussed available ovarian cancer screening (pelvic exams, CA-125 blood tests, and transvaginal ultrasounds) as well as the significant  limitations of this screening. As such, this screening is not typically recommended. That being said, women in this family should discuss this screening and the signs and symptoms of ovarian cancer with their primary OB/GYN provider, as they may have individualized recommendations.    Other population cancer screening options, such as those recommended by the American Cancer Society and the National Comprehensive Cancer Network (NCCN), are also appropriate for Erwin and his family. These screening recommendations may change if there are changes to Erwin's personal and/or family history. Final screening recommendations should be made by each individual's managing physician.    We discussed that Erwin could participate in our Cancer Risk Management Program in which our nursing specialist provides an individual screening plan and assists with medical management. Erwin opted to defer this referral at this time, but did mentioned that he would be interested closer to the time when he would need to start his colonoscopies.     Of note, the above information is based on our current understanding of Erwin's genetic findings. Erwin is encouraged to reach out to me regularly regarding any pertinent updates to his personal and/or family history of cancer, as our understanding of the genetic findings in his family may change over time.     We also discussed that is important to consider that his mother or maternal grandmother could have had a mutation in one of the genes that Erwin tested negative for, he just didn't inherit it.     Implications for Family Members:  We reviewed that mutations in the CHEK2 gene are inherited in an autosomal dominant pattern. This means that each of Erwin's children have a 50% chance of inheriting the same mutation. Likewise, each of his siblings has a 50% risk of having the same mutation. Extended relatives may also carry this mutation, and he is encouraged to share this  "information with his family members on both sides of the family. I am happy to help his relatives connect with a genetic counselor in their area if they would like to discuss testing.    Additional Testing Considerations:  Even though Erwin's genetic testing result was positive, other relatives may carry a different gene mutation associated with cancers in Erwin's family. Genetic counseling is recommended for his sister and his maternal aunts and uncle to discuss genetic testing options.  If any of these relatives do pursue genetic testing, Erwin is encouraged to contact me so that we may review the impact of their test results on Erwin.      Plan:  1.  I provided Erwin with a copy of his test results and support resources today.  2.  I will provide a \"Dear Relative\" letter for Erwin to share with his family members.  3.  He plans to follow up with his primary care doctor.  4. He will contact the clinic when he wishes to be referred to the clinical nurse specialist to discuss screening for individuals with CHEK2 mutations.    If Erwin has additional questions, I encouraged him to contact me directly at [phone].     Time spent face to face: 20 minutes    Nini Leonard MS, [facility]  Licensed Genetic Counselor  [facility]  [phone]    "

## 2021-06-01 VITALS — BODY MASS INDEX: 34.58 KG/M2 | WEIGHT: 247 LBS | HEIGHT: 71 IN

## 2021-06-02 VITALS — HEIGHT: 71 IN | WEIGHT: 232 LBS | BODY MASS INDEX: 32.48 KG/M2

## 2021-06-03 VITALS — WEIGHT: 236.06 LBS | BODY MASS INDEX: 33.05 KG/M2 | HEIGHT: 71 IN

## 2021-06-04 ENCOUNTER — OFFICE VISIT (OUTPATIENT)
Dept: FAMILY MEDICINE | Facility: CLINIC | Age: 36
End: 2021-06-04
Payer: COMMERCIAL

## 2021-06-04 VITALS
BODY MASS INDEX: 30.96 KG/M2 | DIASTOLIC BLOOD PRESSURE: 68 MMHG | HEART RATE: 64 BPM | WEIGHT: 221.13 LBS | RESPIRATION RATE: 12 BRPM | HEIGHT: 71 IN | TEMPERATURE: 97.3 F | SYSTOLIC BLOOD PRESSURE: 110 MMHG

## 2021-06-04 VITALS
HEART RATE: 64 BPM | WEIGHT: 240.5 LBS | SYSTOLIC BLOOD PRESSURE: 112 MMHG | DIASTOLIC BLOOD PRESSURE: 70 MMHG | TEMPERATURE: 97.6 F | BODY MASS INDEX: 33.67 KG/M2 | HEIGHT: 71 IN | RESPIRATION RATE: 12 BRPM

## 2021-06-04 DIAGNOSIS — F41.9 ANXIETY: ICD-10-CM

## 2021-06-04 DIAGNOSIS — R00.1 BRADYCARDIA: ICD-10-CM

## 2021-06-04 DIAGNOSIS — Z00.00 HEALTHCARE MAINTENANCE: Primary | ICD-10-CM

## 2021-06-04 LAB
ANION GAP SERPL CALCULATED.3IONS-SCNC: 7 MMOL/L (ref 3–14)
BUN SERPL-MCNC: 10 MG/DL (ref 7–30)
CALCIUM SERPL-MCNC: 9.2 MG/DL (ref 8.5–10.1)
CHLORIDE SERPL-SCNC: 106 MMOL/L (ref 94–109)
CHOLEST SERPL-MCNC: 127 MG/DL
CO2 SERPL-SCNC: 27 MMOL/L (ref 20–32)
CREAT SERPL-MCNC: 0.96 MG/DL (ref 0.66–1.25)
ERYTHROCYTE [DISTWIDTH] IN BLOOD BY AUTOMATED COUNT: 12.8 % (ref 10–15)
GFR SERPL CREATININE-BSD FRML MDRD: >90 ML/MIN/{1.73_M2}
GLUCOSE SERPL-MCNC: 89 MG/DL (ref 70–99)
HCT VFR BLD AUTO: 44.2 % (ref 40–53)
HDLC SERPL-MCNC: 40 MG/DL
HGB BLD-MCNC: 14.8 G/DL (ref 13.3–17.7)
LDLC SERPL CALC-MCNC: 75 MG/DL
MCH RBC QN AUTO: 30 PG (ref 26.5–33)
MCHC RBC AUTO-ENTMCNC: 33.5 G/DL (ref 31.5–36.5)
MCV RBC AUTO: 90 FL (ref 78–100)
NONHDLC SERPL-MCNC: 87 MG/DL
PLATELET # BLD AUTO: 132 10E9/L (ref 150–450)
POTASSIUM SERPL-SCNC: 4 MMOL/L (ref 3.4–5.3)
RBC # BLD AUTO: 4.93 10E12/L (ref 4.4–5.9)
SODIUM SERPL-SCNC: 140 MMOL/L (ref 133–144)
TRIGL SERPL-MCNC: 59 MG/DL
TSH SERPL DL<=0.005 MIU/L-ACNC: 2.16 MU/L (ref 0.4–4)
WBC # BLD AUTO: 5.2 10E9/L (ref 4–11)

## 2021-06-04 PROCEDURE — 36415 COLL VENOUS BLD VENIPUNCTURE: CPT | Performed by: FAMILY MEDICINE

## 2021-06-04 PROCEDURE — 80061 LIPID PANEL: CPT | Performed by: FAMILY MEDICINE

## 2021-06-04 PROCEDURE — 85027 COMPLETE CBC AUTOMATED: CPT | Performed by: FAMILY MEDICINE

## 2021-06-04 PROCEDURE — 99395 PREV VISIT EST AGE 18-39: CPT | Performed by: FAMILY MEDICINE

## 2021-06-04 PROCEDURE — 84443 ASSAY THYROID STIM HORMONE: CPT | Performed by: FAMILY MEDICINE

## 2021-06-04 PROCEDURE — 80048 BASIC METABOLIC PNL TOTAL CA: CPT | Performed by: FAMILY MEDICINE

## 2021-06-04 RX ORDER — FLUOROURACIL 50 MG/G
CREAM TOPICAL DAILY
COMMUNITY
Start: 2021-02-09 | End: 2023-08-13

## 2021-06-04 RX ORDER — CITALOPRAM HYDROBROMIDE 10 MG/1
10 TABLET ORAL DAILY
COMMUNITY
Start: 2020-12-17 | End: 2021-06-04

## 2021-06-04 RX ORDER — CITALOPRAM HYDROBROMIDE 10 MG/1
10 TABLET ORAL DAILY
Qty: 90 TABLET | Refills: 3 | Status: SHIPPED | OUTPATIENT
Start: 2021-06-04 | End: 2022-06-08

## 2021-06-04 ASSESSMENT — ANXIETY QUESTIONNAIRES
3. WORRYING TOO MUCH ABOUT DIFFERENT THINGS: NOT AT ALL
IF YOU CHECKED OFF ANY PROBLEMS ON THIS QUESTIONNAIRE, HOW DIFFICULT HAVE THESE PROBLEMS MADE IT FOR YOU TO DO YOUR WORK, TAKE CARE OF THINGS AT HOME, OR GET ALONG WITH OTHER PEOPLE: NOT DIFFICULT AT ALL
GAD7 TOTAL SCORE: 0
5. BEING SO RESTLESS THAT IT IS HARD TO SIT STILL: NOT AT ALL
1. FEELING NERVOUS, ANXIOUS, OR ON EDGE: NOT AT ALL
7. FEELING AFRAID AS IF SOMETHING AWFUL MIGHT HAPPEN: NOT AT ALL
6. BECOMING EASILY ANNOYED OR IRRITABLE: NOT AT ALL
2. NOT BEING ABLE TO STOP OR CONTROL WORRYING: NOT AT ALL

## 2021-06-04 ASSESSMENT — MIFFLIN-ST. JEOR: SCORE: 1950.15

## 2021-06-04 ASSESSMENT — PATIENT HEALTH QUESTIONNAIRE - PHQ9
SUM OF ALL RESPONSES TO PHQ QUESTIONS 1-9: 1
5. POOR APPETITE OR OVEREATING: NOT AT ALL

## 2021-06-04 ASSESSMENT — ENCOUNTER SYMPTOMS
MYALGIAS: 0
DIZZINESS: 0
SORE THROAT: 0
ARTHRALGIAS: 0
FREQUENCY: 0
CHILLS: 0
EYE PAIN: 0
NAUSEA: 0
HEARTBURN: 0
PALPITATIONS: 0
HEMATOCHEZIA: 0
FEVER: 0
DYSURIA: 0
SHORTNESS OF BREATH: 0
JOINT SWELLING: 0
HEMATURIA: 0
PARESTHESIAS: 0
HEADACHES: 0
ABDOMINAL PAIN: 0
COUGH: 0
WEAKNESS: 0
CONSTIPATION: 0
NERVOUS/ANXIOUS: 0
DIARRHEA: 0

## 2021-06-04 NOTE — PROGRESS NOTES
"Assessment/Plan:     Healthcare Maintenance Exam    Fasting labs pending  Immunizations up to date  Healthy lifestyle modifications discussed  Colonoscopy UTD - polyps - repeat in 2023    BMI:   Estimated body mass index is 30.84 kg/m  as calculated from the following:    Height as of this encounter: 1.803 m (5' 11\").    Weight as of this encounter: 100.3 kg (221 lb 2 oz).   Weight management plan: Discussed healthy diet and exercise guidelines          Healthcare maintenance  - Basic metabolic panel  (Ca, Cl, CO2, Creat, Gluc, K, Na, BUN)  - Lipid panel reflex to direct LDL Fasting    Bradycardia  Asymptomatic and stable at this point.  He will contact me if he has any further concerns.  Otherwise, labs below will be drawn.  - TSH with free T4 reflex  - CBC with platelets    Anxiety  Refill Celexa.  Discussed weaning process if he would like.  Can start breaking tablets in half when he would like to wean.  - citalopram (CELEXA) 10 MG tablet  Dispense: 90 tablet; Refill: 3      Patient has been advised of split billing requirements and indicates understanding: Yes    Subjective:     Erwin Mauro is a 36 year old male who presents for an annual exam. Concerns are as follows:    He is overall doing well.  He has a 7 and 4-year-old.  He has been doing much of his work remotely but will be going back to the office sometime soon.    He has a family history of cancers and had a genetic test showing that he was predisposed to colon cancer.  He had a colonoscopy last December showing colon polyps.  Repeat in 3 years.    Healthy Habits:   Regular Exercise: Yes  Sunscreen Use: yes  Healthy Diet: Yes  Dental Visits Regularly: yes  Seat Belt: yes  Sexually active: Yes  Self Testicular Exam Monthly:Yes  Colonoscopy: Yes  Lipid Profile: Yes  Glucose Screen: Yes    Immunization status: UP to date.      Current Outpatient Medications   Medication Sig Dispense Refill     citalopram (CELEXA) 10 MG tablet Take 1 tablet (10 mg) " by mouth daily 90 tablet 3     fluorouracil (EFUDEX) 5 % external cream PLEASE SEE ATTACHED FOR DETAILED DIRECTIONS       No past medical history on file.  No past surgical history on file.  Patient has no known allergies.  Family History   Problem Relation Age of Onset     Breast Cancer Mother      Ovarian Cancer Mother      Social History     Socioeconomic History     Marital status:      Spouse name: Not on file     Number of children: Not on file     Years of education: Not on file     Highest education level: Not on file   Occupational History     Not on file   Social Needs     Financial resource strain: Not on file     Food insecurity     Worry: Not on file     Inability: Not on file     Transportation needs     Medical: Not on file     Non-medical: Not on file   Tobacco Use     Smoking status: Former Smoker     Smokeless tobacco: Never Used   Substance and Sexual Activity     Alcohol use: Not on file     Drug use: Not on file     Sexual activity: Not on file   Lifestyle     Physical activity     Days per week: Not on file     Minutes per session: Not on file     Stress: Not on file   Relationships     Social connections     Talks on phone: Not on file     Gets together: Not on file     Attends Roman [employer] service: Not on file     Active member of club or organization: Not on file     Attends meetings of clubs or organizations: Not on file     Relationship status: Not on file     Intimate partner violence     Fear of current or ex partner: Not on file     Emotionally abused: Not on file     Physically abused: Not on file     Forced sexual activity: Not on file   Other Topics Concern     Not on file   Social History Narrative     Not on file       Review of Systems  General:  Denies problems  Eyes:  Denies problems  Ears/Nose/Throat:  Denies problems  Cardiovascular:  Denies problems  Respiratory:  Denies problems  Gastrointestinal:  Denies problems  Genitourinary:  Denies problems  Musculoskeletal:   "Denies problems  Skin:  Denies problems  Neurologic:  Denies problems  Psychiatric:  Denies problems  Endocrine:  Denies problems  Heme/Lymphatic:  Denies problems  Allergic/Immunologic:  Denies problems      Objective:      Vitals:    06/04/21 0708   BP: 110/68   Pulse: 64   Resp: 12   Temp: 97.3  F (36.3  C)   TempSrc: Tympanic   Weight: 100.3 kg (221 lb 2 oz)   Height: 1.803 m (5' 11\")         Physical Exam:  General Appearance: Alert, cooperative, no distress, appears stated age  Head: Normocephalic, without obvious abnormality, atraumatic  Eyes: PERRL, conjunctiva/corneas clear, EOM's intact  Ears: Normal TM's and external ear canals, both ears   Neck: Supple, symmetrical, trachea midline, no adenopathy;  thyroid: not enlarged, symmetric, no tenderness/mass/nodules  Back: Symmetric, no curvature, ROM normal, no CVA tenderness   Lungs: Clear to auscultation bilaterally, respirations unlabored  Chest: no visible abnormalities.  Heart: Regular rate and rhythm, S1 and S2 normal, no murmur, rub, or gallop,   Abdomen: Soft, non-tender, bowel sounds active all four quadrants,  no masses, no organomegaly  : deferred - no concerns  Extremities: Extremities normal, atraumatic, no cyanosis or edema  Skin: Skin color, texture, turgor normal, no rashes or lesions  Lymph nodes: Cervical, supraclavicular, and axillary nodes normal  Neurologic: Normal    Answers for HPI/ROS submitted by the patient on 6/4/2021   Annual Exam:  Frequency of exercise:: 2-3 days/week  Getting at least 3 servings of Calcium per day:: Yes  Diet:: Other  Taking medications regularly:: Yes  Medication side effects:: None  Bi-annual eye exam:: Yes  Dental care twice a year:: Yes  Sleep apnea or symptoms of sleep apnea:: Daytime drowsiness  abdominal pain: No  Blood in stool: No  Blood in urine: No  chest pain: No  chills: No  congestion: No  constipation: No  cough: No  diarrhea: No  dizziness: No  ear pain: No  eye pain: No  nervous/anxious: " No  fever: No  frequency: No  genital sores: No  headaches: No  hearing loss: No  heartburn: No  arthralgias: No  joint swelling: No  peripheral edema: No  mood changes: No  myalgias: No  nausea: No  dysuria: No  palpitations: No  Skin sensation changes: No  sore throat: No  urgency: No  rash: No  shortness of breath: No  visual disturbance: No  weakness: No  impotence: No  penile discharge: No  Additional concerns today:: Yes  Duration of exercise:: 30-45 minutes

## 2021-06-05 ASSESSMENT — ANXIETY QUESTIONNAIRES: GAD7 TOTAL SCORE: 0

## 2021-06-05 NOTE — TELEPHONE ENCOUNTER
Received WQ referral from Nini Leonard to schedule with Abbie Rosario for [facility]. Left  for patient to schedule. EKR

## 2021-06-05 NOTE — TELEPHONE ENCOUNTER
Phone call to Brad return his voicemail from Monday (02/03) afternoon. Brad was not available, so I left a voicemail with my contact information.    Nini Leonard MS, [facility]  Licensed Genetic Counselor  [facility]  [phone]

## 2021-06-06 NOTE — PROGRESS NOTES
Assessment/Plan:        Healthcare Maintenance Exam    Fasting labs pending  Immunizations updated  Healthy lifestyle modifications discussed  Discussed self testicular exams  Colonoscopy ordered  The following high BMI interventions were performed this visit: encouragement to exercise and weight monitoring      1. Routine general medical examination at a health care facility  - Lipid Cascade  - 2(CBC w/o Differential)    2. Family history of malignant neoplasm of gastrointestinal tract  Will follow up with High Risk clinic  - Ambulatory referral for Colonoscopy    3. Abdominal pain, generalized  Labs as below  - Ambulatory referral for Colonoscopy  - Comprehensive Metabolic Panel  - 2(CBC w/o Differential)    4. Bloating  - Ambulatory referral for Colonoscopy    5. Anxiety state  refill  - citalopram (CELEXA) 10 MG tablet; Take 1 tablet (10 mg total) by mouth daily.  Dispense: 90 tablet; Refill: 4           Subjective:      Erwin Mauro is a 34 y.o. male who presents for an annual exam. Concerns are as follows:    He is overall doing fairly well.  He has 2 boys age 5 and 3.  He has a family history of pancreatic cancer with his mother and saw the  last year.  He tested positive for CHEK2 mutation which apparently increases risk for breast and colon cancers.  He will be following up at the high risk clinic to discuss further screening.    He notes that he has been having some GI symptoms.  He oscillates between constipation and diarrhea.  He will often feel very bloated.  No blood in his stool.  Sometimes with the bloating he will also noticed some mid deep back pain as well.    Hx anxiety - Celexa 10mg - stable    Healthy Habits:   Regular Exercise: Yes  Sunscreen Use: Yes  Healthy Diet: Yes  Dental Visits Regularly: Yes  Seat Belt: Yes  Sexually active: Yes  Self Testicular Exam Monthly:Yes  Colonoscopy: No  Lipid Profile: Yes  Glucose Screen: Yes      Immunization History   Administered  Date(s) Administered     Influenza, seasonal,quad inj 6-35 mos 10/17/2014     Tdap 12/22/2006, 06/10/2016     Immunization status: up to date and documented.      Current Outpatient Medications   Medication Sig Dispense Refill     citalopram (CELEXA) 10 MG tablet Take 1 tablet (10 mg total) by mouth daily. 90 tablet 4     omeprazole (PRILOSEC) 20 MG capsule Take 1 capsule (20 mg total) by mouth daily as needed (indigestion/heartburn). 90 capsule 3     No current facility-administered medications for this visit.      Past Medical History:   Diagnosis Date     Anxiety      GERD (gastroesophageal reflux disease)      Past Surgical History:   Procedure Laterality Date     lasix       MOLE REMOVAL       Patient has no known allergies.  Family History   Problem Relation Age of Onset     Cancer Mother         skin, stomach, pancreatic     Diabetes Mother      Heart disease Maternal Grandmother      Diabetes Maternal Grandmother      Diabetes Maternal Grandfather      Social History     Socioeconomic History     Marital status:      Spouse name: Not on file     Number of children: Not on file     Years of education: Not on file     Highest education level: Not on file   Occupational History     Not on file   Social Needs     Financial resource strain: Not on file     Food insecurity:     Worry: Not on file     Inability: Not on file     Transportation needs:     Medical: Not on file     Non-medical: Not on file   Tobacco Use     Smoking status: Never Smoker     Smokeless tobacco: Never Used   Substance and Sexual Activity     Alcohol use: Not on file     Drug use: Not on file     Sexual activity: Yes   Lifestyle     Physical activity:     Days per week: Not on file     Minutes per session: Not on file     Stress: Not on file   Relationships     Social connections:     Talks on phone: Not on file     Gets together: Not on file     Attends Restorationism service: Not on file     Active member of club or organization: Not on  "file     Attends meetings of clubs or organizations: Not on file     Relationship status: Not on file     Intimate partner violence:     Fear of current or ex partner: Not on file     Emotionally abused: Not on file     Physically abused: Not on file     Forced sexual activity: Not on file   Other Topics Concern     Not on file   Social History Narrative     Not on file       Review of Systems  General:  Denies problems  Eyes:  Denies problems  Ears/Nose/Throat:  Denies problems  Cardiovascular:  Denies problems  Respiratory:  Denies problems  Gastrointestinal:  Denies problems  Genitourinary:  Denies problems  Musculoskeletal:  Denies problems  Skin:  Denies problems  Neurologic:  Denies problems  Psychiatric:  Denies problems  Endocrine:  Denies problems  Heme/Lymphatic:  Denies problems  Allergic/Immunologic:  Denies problems         Objective:         Vitals:    03/04/20 0836   BP: 112/70   Pulse: 64   Resp: 12   Temp: 97.6  F (36.4  C)   TempSrc: Oral   Weight: (!) 240 lb 8 oz (109.1 kg)   Height: 5' 11\" (1.803 m)       Physical Exam:  General Appearance: Alert, cooperative, no distress, appears stated age  Head: Normocephalic, without obvious abnormality, atraumatic  Eyes: PERRL, conjunctiva/corneas clear, EOM's intact  Ears: Normal TM's and external ear canals, both ears   Nose:Nares normal, septum midline,mucosa normal, no drainage   Throat:Lips, mucosa, and tongue normal; teeth and gums normal  Neck: Supple, symmetrical, trachea midline, no adenopathy;  thyroid: not enlarged, symmetric, no tenderness/mass/nodules  Back: Symmetric, no curvature, ROM normal, no CVA tenderness   Lungs: Clear to auscultation bilaterally, respirations unlabored  Chest: no visible abnormalities.  Heart: Regular rate and rhythm, S1 and S2 normal, no murmur, rub, or gallop,   Abdomen: Soft, non-tender, bowel sounds active all four quadrants,  no masses, no organomegaly  : normal appearing penis without lesion, testicular " examination is normal with no masses/tenderness, no hernias palpated bilaterally  Extremities: Extremities normal, atraumatic, no cyanosis or edema  Skin: Skin color, texture, turgor normal, no rashes or lesions  Lymph nodes: Cervical, supraclavicular, and axillary nodes normal  Neurologic: Normal

## 2021-06-06 NOTE — TELEPHONE ENCOUNTER
Who is calling:  Erwin Mauro   Reason for Call:  Scheduled lab appointment today at 9:15 am.   Date of last appointment with primary care: n/a  Okay to leave a detailed message: Yes

## 2021-06-07 NOTE — TELEPHONE ENCOUNTER
After reviewing chart Abbie Rosario prefers to wait until colonoscopy has been completed and consult with him in September. I have left a voicemail for Erwin to confirm cancellation for 4/1 and reschedule to September with Abbie.

## 2021-06-12 NOTE — PROGRESS NOTES
"Erwin Mauro is a 35 y.o. male who is being evaluated via a billable video visit.      The patient has been notified of following:     \"This video visit will be conducted via a call between you and your physician/provider. We have found that certain health care needs can be provided without the need for a physical exam.  This service lets us provide the care you need with a short phone conversation.  If a prescription is necessary we can send it directly to your pharmacy.  If lab work is needed we can place an order for that and you can then stop by our lab to have the test done at a later time.    Video visits are billed at different rates depending on your insurance coverage. During this emergency period, for some insurers they may be billed the same as an in-person visit.  Please reach out to your insurance provider with any questions.    If during the course of the call the physician/provider feels a video visit is not appropriate, you will not be charged for this service.\"    Patient has given verbal consent to a Video visit? Yes    What phone number would you like to be contacted at? [phone]    Patient would like to receive their AVS by AVS Preference: [email or web address].     Visit completed using [employer].    Video visitl duration: 16 minutes    Abbie Rosario CNP    Oncology Risk Management Consultation:  Date on this visit: 11/4/2020    Erwin Mauro  is referred by Malcolm, Licensed Genetic Counselor,  for an oncology risk management consultation via a video visit.. He is being seen via a video visit today through [employer].     He requires high risk screening and surveillance to reduce  her risk of cancer secondary to having a deleterious CHEK2 mutation.  He is considered to be at high risk for hereditary  colon cancer.    Primary Physician: Tenisha Manrique MD     History Of Present Illness:  Mr. Mauro is a 35 y.o. male who presents with family history of cancer and a " personal diagnosis of a CHEK2 mutation.     Genetic Testin/10/2019 --  POSITIVE for a CHEK2 mutation. Specifically his mutation is called [id number] identified using a CustomNext-Cancer (CancerNext +MelanomaNext) panel from [facility].   This testing was done because of his family history of pancreatic cancer, thyroid cancer, ovarian cancer and melanoma.     Of note, Erwin tested negative for mutations in the following genes by sequencing and deletion/duplication analysis: Erwin is negative for mutations in the APC, BELLE, BAP1, BARD1, BRCA1, BRCA2, BRIP1, BMPR1A, CDH1, CDK4, CDKN2A, DICER1, EPCAM, HOXB13, GREM1, MLH1, MRE11A, MSH2, MSH6, MUTYH, NBN, NF1, PALB2, PMS2, POLD1, POLE, PTEN, RAD50, RAD51C, RAD51D, RB1, SMAD4, SMARCA4, STK11, and TP53 genes. No mutations were found in any of the remaining 36 genes analyzed. This test involved sequencing and deletion/duplication analysis of all genes with the exception of EPCAM and GREM1 (deletions only).    Pertinent history:  None to date.    Pertinent screening history:  None to date.    At this visit, he denies new fatigue, blood in the stool, constipation, diarrhea, and weight loss. He does have a history of GERD and acknowledges abdominal pain, bloating and heartburn, which are all intermittent.    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 90 tablet 4     omeprazole (PRILOSEC) 20 MG capsule Take 1 capsule (20 mg total) by mouth daily as needed (indigestion/heartburn). 90 capsule 3     No  current facility-administered medications for this visit.         Family History:  Family History   Problem Relation Age of Onset     Diabetes Mother      Hodgkin's lymphoma Mother 45     Melanoma Mother 42        melanoma     Lymphoma Mother 50        non-Hodgkins     Thyroid cancer Mother 50     Pancreatic cancer Mother 57     Heart disease Maternal Grandmother      Diabetes Maternal Grandmother      Ovarian cancer Maternal Grandmother          at 83     Diabetes Maternal Grandfather      Skin cancer Maternal Grandfather         basal cell     Skin cancer Maternal Aunt         basal cell     Colon cancer Other 70        maternal grandfather's brother;  in early 70s     Colon cancer Other 80        paternal grandmother's brother       Social History:  Social History     Socioeconomic History     Marital status:      Spouse name: Not on file     Number of children: Not on file     Years of education: Not on file     Highest education level: Not on file   Occupational History     Not on file   Social Needs     Financial resource strain: Not on file     Food insecurity     Worry: Not on file     Inability: Not on file     Transportation needs     Medical: Not on file     Non-medical: Not on file   Tobacco Use     Smoking status: Never Smoker     Smokeless tobacco: Never Used   Substance and Sexual Activity     Alcohol use: Not on file     Drug use: Not on file     Sexual activity: Yes   Lifestyle     Physical activity     Days per week: Not on file     Minutes per session: Not on file     Stress: Not on file   Relationships     Social connections     Talks on phone: Not on file     Gets together: Not on file     Attends [employer] service: Not on file     Active member of club or organization: Not on file     Attends meetings of clubs or organizations: Not on file     Relationship status: Not on file     Intimate partner violence     Fear of current or ex partner: Not on file     Emotionally abused: Not  on file     Physically abused: Not on file     Forced sexual activity: Not on file   Other Topics Concern     Not on file   Social History Narrative     Not on file       Physical Exam:  There were no vitals taken for this visit.  GENERAL: Healthy, alert and no distress  EYES: Eyes grossly normal to inspection. No discharge or erythema, or obvious scleral/conjunctival abnormalities.  RESP: No audible wheeze, cough, or visible cyanosis.  No visible retractions or increased work of breathing.    NEURO: Cranial nerves grossly intact. Mentation and speech appropriate for age.  PSYCH: Mentation appears normal, affect normal/bright, judgement and insight intact, normal speech and appearance well-groomed    Laboratory/Imaging Studies  Results for orders placed or performed in visit on 07/15/20   SS-A/RO Auto Antibodies   Result Value Ref Range    SS-A/Ro Autoantibodies 0 <20 EU   SS-B/LA Auto Antibodies   Result Value Ref Range    SS-B/La Autoantibodies 0 <20 EU   Sedimentation Rate   Result Value Ref Range    Sed Rate 0 0 - 15 mm/hr   C-Reactive Protein (CRP)   Result Value Ref Range    CRP <0.1 0.0 - 0.8 mg/dL   Antinuclear Antibodies Screen (CAMRYN)   Result Value Ref Range    CAMRYN Screen Cascade 0.3 <=2.9 U   Rheumatoid Factor Screen   Result Value Ref Range    Rheumatoid Factor Quantitative <15.0 0 - 30 IU/mL       ASSESSMENT  We discussed the differences between cancer risk for the general population and those with CHEK2 genetic mutation.     The CHEK2 gene is located on  the long (q) arm of chromosome 22 at position 12.1; it provides instructions for making a protein called checkpoint kinase 2 (CHK2). This protein acts as a tumor suppressor, which means that it regulates cell division by keeping cells from growing and dividing too rapidly or in an uncontrolled way.    The CHK2 protein is activated when DNA becomes damaged or when DNA strands break. DNA can be damaged by agents such as toxic chemicals, radiation, or  ultraviolet (UV) rays from sunlight, and breaks in DNA strands also occur naturally when chromosomes exchange genetic material.    In response to DNA damage, the CHK2 protein interacts with several other proteins, including tumor protein 53 (which is produced from the TP53 gene). These proteins halt cell division and determine whether a cell will repair the damage or self-destruct in a controlled manner (undergo apoptosis). This process keeps cells with mutated or damaged DNA from dividing, which helps prevent the development of tumors    Current literature shows that women with CHEK2 mutations have a 2 fold increased estimated risk for breast cancer than women within the general population. Both men and women with monoallelic (1 copy) CHEK2 genetic mutations have a 2 fold risk for colon cancer, meaning it is considered to be twice as high as that of the general population.     It is recommended that people with CHEK2 genetic mutations, even with no family history of colon cancer, begin having colonoscopies at age 40, then every 5 years thereafter.     Brad has discussed his situation with his primary care provider already and he is scheduled for a colonoscopy in two weeks through [facility].  We dicussed that in some cases with CHEK2+, we see many tiny polyps, but in other cases none. He should have follow up per the findings of the colonoscopy, but no less than every 5 years.      We also discussed following  a healthy lifestyle plan recommended by both NCCN and the American Cancer Society that can reduce the risk of cancer:  1 Limit alcohol consumption to less than 1 drink per day (1 drink=5 oz.wine, 12 oz. Beer or 1.5 oz. 80-proof liquor).    2. Exercise per American Cancer Society guidelines of at least 150 minutes of moderate-intensity activity or 75 minutes of vigorous activity each week. (Or a combination of both.) Exercise should be spread  out over the week.    3. Maintain a healthy weight with a Body Mass Index  between 19-24.9, especially after menopause.    4. Do not use tobacco products and limit exposure to passive smoke.    5. Breastfeeding for at least 16 months over the course of a lifetime, has been shown to reduce one's risk for breast cancer by 27%.    6. Limit post menopausal hormone replacement therapy.    7. Avoid processed meats (ham, lopez, salami, hot dogs, sausages). Eat little, if any.     8. Limit red meat (pork, beef and lamb) to 12-18 oz per week or less.    9.. Limit consumption of sugar sweetened drinks, fast foods, processed foods and foods high in starch or sugar content.    10. Eat about 90 grams (3 servings) of whole grain foods pr day. Whole grains offer Vitamin E, ligands, phytoestrogens, zinc, selenium, antioxidants, resistant starch and copper. Research shows that eating 3 servings of whole grains can reduce the risk for colon cancer about 17%.  Focus on vegetables, fruits, beans and plant based foods.    We discussed that I am happy to see him in the future to discuss any changes to CHEK2+ guidelines. We discussed that his sister has not had genetic testing yet, but she may want to consider that in the future, before age 40, so that she can have appropriate screening.  His sons could consider genetic testing in adulthood.  At this time, no other screening, other than colon screening is recommended for him.  He should also have appropriate skin checks, based on his mother's history of melanoma.     INDIVIDUALIZED CANCER RISK MANAGEMENT PLAN:    Individualized Surveillance Plan for people with   With CHEK2 genetic mutations   Per NCCN Guidelines for Genetic/Familial High-risk Assessment: Breast and Ovarian Version 1.2020   Recommended screening Test or procedure Last done Next Scheduled    Breast screening -   Stating at age 18. Breast self-awareness, so that women are familiar with normal breast changes and report unusual changes to their health care provider.   NA   NA   Breast screening    Age >40-75 years.    *May begin at age 40 if breast cancers in the family occur at later ages.      Clinical breast exams every 6 -12 months    Annual mammogram    NA   NA   Breast screening  Age >40-75 years old Consider annual breast MRI, preferably on day 7-15 of menstrual cycle for premenopausal women.   NA   NA   Colon cancer screening For people with a 1st degree relative with CRC: begin colonoscopy at age 40 or 10 years earlier than the 1st degree relative's CRC. Repeat every 5 years.    For people with no 1st degree relative with CRC, begin at age 40, and screen every 5 years. Discuss signs and symptoms to address Scheduled to be done soon.  Can re-screen based on findings and continue every 5 years, especially beginning at age 40    Women with CHEK2 genetic mutations are considered to have a higher risk for breast cancer with frameshift mutations. The risks for missense mutations is unclear.     For some mutations such as [id number], the risk for breast cancer appears to be lower.  Evidence is insufficient to recommend risk reducing mastectomy; manage based on family history.       I spent 16 minutes with the patient with greater that 50% of it in counseling and coordinating care as documented above.    Abbie Rosario, APRN-CNS, OCN, AGN-BC  Clinical Nurse Specialist  Cancer Risk Management Program  [facility] [address]  [place] [address]    phone:  [phone]  Pager: [phone]  fax: [phone]    CC: Tenisha Manrique MD

## 2021-06-12 NOTE — PROGRESS NOTES
"Erwin Mauro is a 35 y.o. male who is being evaluated via a billable video visit.      The patient has been notified of following:     \"This video visit will be conducted via a call between you and your physician/provider. We have found that certain health care needs can be provided without the need for a physical exam.  This service lets us provide the care you need with a short phone conversation.  If a prescription is necessary we can send it directly to your pharmacy.  If lab work is needed we can place an order for that and you can then stop by our lab to have the test done at a later time.    Video visits are billed at different rates depending on your insurance coverage. During this emergency period, for some insurers they may be billed the same as an in-person visit.  Please reach out to your insurance provider with any questions.    If during the course of the call the physician/provider feels a video visit is not appropriate, you will not be charged for this service.\"    Patient has given verbal consent to a Video visit? Yes    What phone number would you like to be contacted at? [phone]    Patient would like to receive their AVS by AVS Preference: Al.     Visit completed using [employer].    Video visitl duration: 16 minutes    Abbie Rosario CNP    "

## 2021-06-17 NOTE — PROGRESS NOTES
Assessment/Plan:        Healthcare Maintenance Exam    Fasting labs pending  Immunizations updated  Healthy lifestyle modifications discussed  Discussed self testicular exams  The following high BMI interventions were performed this visit: encouragement to exercise and weight monitoring      1. Anxiety state  Celexa will be refilled at 10 mg daily.  He feels as though he is doing well on this dose but will increase himself to 20 mg daily and let me know if he begins to have issues.  - citalopram (CELEXA) 10 MG tablet; Take 1 tablet (10 mg total) by mouth daily.  Dispense: 90 tablet; Refill: 3    2. Reflux esophagitis  Refill.  The patient uses this just as needed.  If insurance does not cover it he will buy over-the-counter.  - omeprazole (PRILOSEC) 20 MG capsule; Take 1 capsule (20 mg total) by mouth daily as needed (indigestion/heartburn).  Dispense: 90 capsule; Refill: 3    3. Family hx of melanoma  The patient does have a family history particularly with his mother with melanoma, pancreatic cancer and non-Hodgkin's lymphoma.  He would like to speak to .  There does not seem to be a particularly strong family history otherwise of cancer and I did discuss that there may not be any additional screening test warranted but he states that he would like to speak with someone in case there are additional testing or testing that his children would need to have.  - Ambulatory referral to Genetics    4. Healthcare maintenance  - 2(CBC w/o Differential)  - Lipid Cascade FASTING  - Glucose           Subjective:      Erwin Mauro is a 32 y.o. male who presents for an annual exam. Concerns are as follows:    Reaction ibuprofen: Patient notes that when he takes ibuprofen he will get some itchy spots on his scalp the last for about an hour or 2.  He notes that the spots pop up within a half an hour of taking ibuprofen.  He has noted this for the last year or so but just wanted to mention it today.  He does not  "take much ibuprofen or other medication for that matter.    Ear pain: Patient notes that he has intermittent left ear clogging and pain.  Sometimes when his ear feels clogged he will have \"echoing\" in that ear.  It is generally only the left.  He did use have seasonal allergies but does not really have them as much anymore.    Family history of melanoma: Patient's mother had melanoma.  He is sees a dermatologist every 6 months and has had several procedures.  He did have basal cell carcinoma on his face that he had removed with a Mohs procedure.    He has 2 children at home a 3-1/2-year-old and a 1-1/2-year-old.  They are both boys.    Healthy Habits:   Regular Exercise: Yes  Sunscreen Use: Yes  Healthy Diet: Yes  Dental Visits Regularly: Yes  Seat Belt: Yes  Sexually active: Yes  Self Testicular Exam Monthly:Yes  Colonoscopy: No  Lipid Profile: Yes  Glucose Screen: Yes      Immunization History   Administered Date(s) Administered     Influenza, seasonal,quad inj 6-35 mos 10/17/2014     Tdap 12/22/2006, 06/10/2016     Immunization status: up to date and documented.      Current Outpatient Prescriptions   Medication Sig Dispense Refill     omeprazole (PRILOSEC) 20 MG capsule Take 1 capsule (20 mg total) by mouth daily as needed (indigestion/heartburn). 90 capsule 3     citalopram (CELEXA) 10 MG tablet Take 1 tablet (10 mg total) by mouth daily. 90 tablet 3     EPINEPHrine (EPIPEN 2-MALACHI) 0.3 mg/0.3 mL (1:1,000) atIn Inject 0.3 mg into the shoulder, thigh, or buttocks as needed (hives/allergic reaction).        No current facility-administered medications for this visit.      Past Medical History:   Diagnosis Date     Anxiety      GERD (gastroesophageal reflux disease)      Past Surgical History:   Procedure Laterality Date     lasix       MOLE REMOVAL       Review of patient's allergies indicates no known allergies.  Family History   Problem Relation Age of Onset     Cancer Mother      skin, stomach, pancreatic     " "Diabetes Mother      Heart disease Maternal Grandmother      Diabetes Maternal Grandmother      Diabetes Maternal Grandfather      Social History     Social History     Marital status:      Spouse name: N/A     Number of children: N/A     Years of education: N/A     Occupational History     Not on file.     Social History Main Topics     Smoking status: Never Smoker     Smokeless tobacco: Never Used     Alcohol use Not on file     Drug use: Not on file     Sexual activity: Yes     Other Topics Concern     Not on file     Social History Narrative       Review of Systems  General:  Denies problems  Eyes:  Denies problems  Ears/Nose/Throat:  Denies problems  Cardiovascular:  Denies problems  Respiratory:  Denies problems  Gastrointestinal:  Denies problems  Genitourinary:  Denies problems  Musculoskeletal:  Denies problems  Skin:  Denies problems  Neurologic:  Denies problems  Psychiatric:  Denies problems  Endocrine:  Denies problems  Heme/Lymphatic:  Denies problems  Allergic/Immunologic:  Denies problems         Objective:         Vitals:    05/01/18 0812   BP: 118/68   Pulse: 64   Resp: 12   Temp: 98.6  F (37  C)   TempSrc: Oral   Weight: (!) 247 lb (112 kg)   Height: 5' 11\" (1.803 m)       Physical Exam:  General Appearance: Alert, cooperative, no distress, appears stated age  Head: Normocephalic, without obvious abnormality, atraumatic  Eyes: PERRL, conjunctiva/corneas clear, EOM's intact  Ears: Normal TM's and external ear canals, both ears   Nose:Nares normal, septum midline,mucosa normal, no drainage   Throat:Lips, mucosa, and tongue normal; teeth and gums normal  Neck: Supple, symmetrical, trachea midline, no adenopathy;  thyroid: not enlarged, symmetric, no tenderness/mass/nodules  Back: Symmetric, no curvature, ROM normal, no CVA tenderness   Lungs: Clear to auscultation bilaterally, respirations unlabored  Chest: no visible abnormalities.  Heart: Regular rate and rhythm, S1 and S2 normal, no murmur, " rub, or gallop,   Abdomen: Soft, non-tender, bowel sounds active all four quadrants,  no masses, no organomegaly  : normal appearing penis without lesion, testicular examination is normal with no masses/tenderness, no hernias palpated bilaterally  Extremities: Extremities normal, atraumatic, no cyanosis or edema  Skin: Skin color, texture, turgor normal, no rashes or lesions  Lymph nodes: Cervical, supraclavicular, and axillary nodes normal  Neurologic: Normal

## 2021-06-19 NOTE — LETTER
Letter by Nini Leonard, Genetic Counselor at      Author: Nini Leonard, Genetic Counselor Service: -- Author Type: --    Filed:  Encounter Date: 6/10/2019 Status: (Other)         Erwin Mauro  [address] [facility] [id number]      Cristal 10, 2019      Dear Mr. Mauro,    It was a pleasure meeting with you at the [facility] on 06/10/2019.  Here is a copy of the progress note from your recent genetic counseling visit.  If you have any additional questions, please feel free to call.    Referring Provider: Tenisha Manrique,*    Presenting Information:   I met with Erwin Mauro today for genetic counseling at the [facility] at the [facility] to discuss his family history of melanoma.  He is here today to review this history, cancer screening recommendations, and available genetic testing options.    Personal History:  Erwin is a 34 y.o. male. He has a history of a basal cell carcinoma at age 31 removed from right beneath his nose.      He has not yet had a colonoscopy due to his age.  He follows with dermatology every 6 months. He reports that he has about 3 moles removed at each visit; most are precancerous. In total, he reports that he has had about 20 moles removed. He does not regularly do any other cancer screening at this time.  Erwin reported a smoking history from ages [age] at which time, he quit. HE reports social alcohol use.     Family History: (Please see scanned pedigree for detailed family history information)    Brad's mother passed away at age 60 with a history of melanoma at age 42, Hodgkin's lymphoma at age 45, non-Hodgkin's lymphoma at age 50, thyroid cancer in her early 50's, and pancreatic cancer at age 57.     One of Brad's maternal aunts has a history of a basal cell carcinoma.     Brad's maternal grandmother passed away at age 83 with a history of ovarian cancer.     Brad's maternal grandfather's brother passed  away in his early 70's with a history of colon cancer.    Brad's paternal grandfather, age 87, has a history of a basal cell carcinoma.    Brad's paternal grandmother's brother passed away in his early 80's with a history of colon cancer.    His maternal ethnicity is [place], [place], [place] . His paternal ethnicity is Guatemalan, [place], and [place].  There is no known Ashkenazi Congregational ancestry on either side of his family. There is no reported consanguinity.    Discussion:    Erwin's family history of pancreatic cancer and ovarian cancer is suggestive of a hereditary cancer syndrome.    We reviewed the features of sporadic, familial, and hereditary cancers. In looking at Erwin's family history, it is possible that a cancer susceptibility gene is present due to his mother's multiple primary cancers including melanoma and pancreatic cancer as well as his maternal grandmother's history of ovarian cancer.    We discussed the natural history and genetics of hereditary melanoma, pancreatic cancer, and ovarian cancer. A detailed handout the information we discussed was provided to Erwin at the end of our appointment today. Topics included: inheritance pattern, cancer risks, cancer screening recommendations, and also risks, benefits and limitations of testing.    One hereditary cancer syndrome that increases the risk for melanoma is Familial Multiple Mole Melanoma Syndrome (FAMMM). Familial Atypical Multiple Mole Melanoma Syndrome (FAMMM) is caused by a single mutation in the CDKN2A gene. The lifetime risk for melanoma is between 50-75%. Some studies indicate that these risks may vary, due to different factors. Individuals with FAMMM typically have many moles (more than 50), which can be atypical. People with FAMMM have increased risks for pancreatic cancer, and possibly other cancers. The exact risk of pancreatic cancer can depend on a persons specific CDKN2A mutation, but has been reported as about 17% by age  75 by one larger study (PMID [id number]). We discussed screening for melanoma (frequent skin exams) and pancreatic cancer (endoscopic ultrasonography (EUS) and/or MRI/magnetic resonance.  Mutations in the BAP1 gene are associated with a diagnosis of BAP1 Tumor Predisposition Syndrome.  Associated cancers/tumors include atypical Spitz tumors (benign skin tumors), uveal (eye) melanoma, mesothelioma, cutaneous (skin) melanoma, and kidney cancer.  There may also be increased risks for other cancers, including basal cell carcinoma, breast, thyroid, meningioma, and neuroendocrine tumors. Currently there are no estimates regarding the prevalence or lifetime risk of these cancers.  Another potential cause of hereditary melanoma is Hereditary Breast and Ovarian Cancer (HBOC) syndrome, which is caused by mutations in the gene BRCA2. BRCA2 is a gene that increases the risk for breast and ovarian cancers, among others. Women who inherit a BRCA2 mutation have a 50 to 85% lifetime risk of breast cancer and a 15 to 45% lifetime risk of ovarian cancer. This is higher than the general population lifetime risks of 12% for breast cancer and less than 2% for ovarian cancer. Men with BRCA2 gene mutations have up to a 7% risk of breast cancer and 20% risk of prostate cancer. Other cancers, such as pancreatic cancer and melanoma, have also been associated with BRCA mutations.  Given the family history of pancreatic cancer and ovarian cancer and colon cancer in his more distant relatives, we also discussed Tristan syndrome. Tristan syndrome can be caused by a mutation in one of five genes:  MLH1, MSH2, MSH6, PMS2, and EPCAM.  Tristan syndrome may present with polyps, but typically a small number.  The highest cancer risks associated with Tristan syndrome include colon cancer, endometrial/uterine cancer, gastric cancer, and ovarian cancer.    Based on his family history, Erwin meets current National Comprehensive Cancer Network (NCCN) criteria for  genetic testing of BRCA1 and BRCA2.      We discussed that there are additional genes that could cause increased risk for pancreatic cancer, ovarian cancer, and melanomas. As many of these genes present with overlapping features in a family and accurate cancer risk cannot always be established based upon the pedigree analysis alone, it would be reasonable for Erwin to consider panel genetic testing to analyze multiple genes at once. After our discussion, Erwin opted to proceed with genetic testing via the CustomNext-Cancer panel through [facility] (CancerNext + MelanomaNext).  Genetic testing is available for 37 genes associated with hereditary cancer: CustomNext-Cancer (APC, BELLE, BAP1, BARD1, BRCA1, BRCA2, BRIP1, BMPR1A, CDH1, CDK4, CDKN2A, CHEK2, DICER1, EPCAM, GREM1, HOXB13, MITF, MLH1, MRE11A, MSH2, MSH6, MUTYH, NBN, NF1, PALB2, PMS2, POLD1, POLE, PTEN, RAD50, RAD51C, RAD51D, RB1, SMAD4, SMARCA4, STK11, and TP53).  We discussed that many of the genes in the CancerNext panel are associated with specific hereditary cancer syndromes and published management guidelines: Hereditary Breast and Ovarian Cancer syndrome (BRCA1, BRCA2), Tristan syndrome (MLH1, MSH2, MSH6, PMS2, EPCAM), Familial Adenomatous Polyposis (APC), Hereditary Diffuse Gastric Cancer (CDH1), Familial Atypical Multiple Mole Melanoma syndrome (CDK4, CDKN2A), Juvenile Polyposis syndrome (BMPR1A, SMAD4), Cowden syndrome (PTEN), Li Fraumeni syndrome (TP53), Peutz-Jeghers syndrome (STK11), MUTYH Associated Polyposis (MUTYH), Neurofibromatosis type 1 (NF1), BAP1-tumor predisposition syndrome (BAP1), and Hereditary Retinoblastoma (RB1).  The BELLE, BRIP1, CHEK2, GREM1, MITF, NBN, PALB2, POLD1, POLE, RAD51C, and RAD51D genes are associated with increased cancer risk and have published management guidelines for certain cancers.    The remaining genes (BARD1, DICER1, HOXB13, MRE11A, RAD50, and SMARCA4) are associated with increased cancer risk and may  allow us to make medical recommendations when mutations are identified.    Consent was obtained and genetic testing for CancerNext was sent to [facility]. Turn around time: 4 weeks    Medical Management: For  Erwin, we reviewed that the information from genetic testing may determine:    additional cancer screening for which Erwin may qualify (i.e. more frequent colonoscopies, more frequent dermatologic exams, pancreatic cancer screening etc.),     and targeted chemotherapies for Erwin  if he were to develop certain cancers in the future (i.e. immunotherapy for individuals with Tristan syndrome, PARP inhibitors, etc.).     These recommendations and possible targeted chemotherapies will be discussed in detail once genetic testing is completed.     Plan:  1) Today Erwin elected to proceed with CustomNext-Cancer genetic testing (37 genes) through [facility].  2) This information should be available in approximately 4 weeks.  3) Erwin will be contacted by our scheduling department to set up a result disclosure appointment either in person or over the phone.     Nini Leonard MS, [facility]  Licensed Genetic Counselor  [facility]  [phone]

## 2021-06-19 NOTE — LETTER
Letter by Nini Leonard, Genetic Counselor at      Author: Nini Leonard, Genetic Counselor Service: -- Author Type: --    Filed:  Encounter Date: 7/18/2019 Status: (Other)         07/18/19    Erwin Mauro  [address] [facility] [address]    Dear Erwin:  Thank you for allowing us to participate in your care.  Your health and progress is important to us. The scheduling department has made several attempts to schedule your return visit for your genetic testing results.  Our attempts to schedule this appointment have been unsuccessful.    Please contact us at [phone] to schedule your follow-up or to let us know how we may assist you.    Sincerely,    The Genetic Counseling Team    CC: Nini Leonard, Genetic Counselor, 07/18/19, 11:22 AM

## 2021-06-19 NOTE — LETTER
Letter by Nini Leonard, Genetic Counselor at      Author: Nini Leonard, Genetic Counselor Service: -- Author Type: --    Filed:  Encounter Date: 7/31/2019 Status: (Other)           July 31, 2019 7/31/2019    Dear Relative    The purpose of this letter is to inform you that your relative recently underwent genetic counseling and genetic testing due to a family history of melanoma, pancreatic cancer, and ovarian cancer.    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 The accession # is [id number].    A mutation (or change in the genetic code) causes a specific gene to stop working properly. Ultimately, individuals who have a mutation in the CHEK2 gene are at increased risk for several types of cancer.    Individuals who carry this CHEK2 gene mutation are at increased risk for developing breast cancer (approximately 2-3 fold higher than the average risk of 12%). CHEK2 gene mutations are also associated with a moderately increased lifetime risk for colorectal cancer.  There may also be increased risk for other cancers. Both men and women can have mutations in the CHEK2 gene and have a 50% chance of passing this mutation on to each of their children.     If individuals are found to have mutations in the CHEK2 gene, increased cancer screening at younger ages may be recommended. All cancer risk management options should be discussed in more detail with an individuals medical providers.     I understand that this may be surprising, unexpected, and even scary news.  Because this mutation has been identified in your family, you are at risk for having the same mutation.  As mentioned earlier, your children may also be at risk.      Scheduling a genetic counseling appointment does not mean you have to undergo genetic testing.  The decision to pursue such testing is a very personal one that is discussed in more detail during the  session.  Indeed, much of cancer genetic counseling is providing valuable information to individuals who are impacted by genetic information such as this.      If you are interested in scheduling a genetic counseling appointment at [facility], please call [phone] to schedule an appointment. You can also find a genetic counselor close to you or at another health system at [facility]    Sincerely,     Nini Leonard MS, [facility]  Licensed Genetic Counselor  [facility]  [phone]

## 2021-06-19 NOTE — LETTER
Letter by Nini Leonard, Genetic Counselor at      Author: Nini Leonard, Genetic Counselor Service: -- Author Type: --    Filed:  Encounter Date: 7/31/2019 Status: (Other)         Erwin Mauro  [address] [facility] [address]      July 31, 2019      Dear Mr. Mauro,    It was a pleasure speaking with you on the phone on 07/31/2019.  Here is a copy of the progress note from our discussion.  If you have any additional questions, please feel free to call.    Referring Provider: Dr. Manrique    Presenting Information:   I spoke with Erwin over the phone today to discuss his genetic testing results. His blood was drawn on 06/10/2019 and we ordered CustomNext-Cancer (CancerNext +MelanomaNext) testing from [facility]. This testing was done because of his family history of pancreatic cancer, thyroid cancer, ovarian cancer and melanoma.     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 We discussed that this mutation is associated with an increased risk for female breast and colon cancer. We discussed the impact of this testing on Erwin in detail.     Of note, Erwin tested negative for mutations in the following genes by sequencing and deletion/duplication analysis: Erwin is negative for mutations in the APC, BELLE, BAP1, BARD1, BRCA1, BRCA2, BRIP1, BMPR1A, CDH1, CDK4, CDKN2A, DICER1, EPCAM, HOXB13, GREM1, MLH1, MRE11A, MSH2, MSH6, MUTYH, NBN, NF1, PALB2, PMS2, POLD1, POLE, PTEN, RAD50, RAD51C, RAD51D, RB1, SMAD4, SMARCA4, STK11, and TP53 genes. No mutations were found in any of the remaining 36 genes analyzed. This test involved sequencing and deletion/duplication analysis of all genes with the exception of EPCAM and GREM1 (deletions only).    Testing did not detect an identifiable mutation associated with Hereditary Breast and Ovarian Cancer syndrome (BRCA1, BRCA2), Tristan syndrome (MLH1, MSH2, MSH6, PMS2, EPCAM),  "Familial Adenomatous Polyposis (APC), Hereditary Diffuse Gastric Cancer (CDH1), Familial Atypical Multiple Mole Melanoma syndrome (CDK4, CDKN2A), Juvenile Polyposis syndrome (BMPR1A, SMAD4), Cowden syndrome (PTEN), Li Fraumeni syndrome (TP53), Peutz-Jeghers syndrome (STK11), MUTYH Associated Polyposis (MUTYH), or Neurofibromatosis type 1 (NF1).  We reviewed the autosomal dominant inheritance of these genes. Erwin cannot pass on a mutation in any of these genes to his children based on this test result. Mutations in these genes do not skip generations.      A copy of the test report can be found in the Media tab and named \"Genetics-Scan [facility]\". The report is scanned in as a linked document.    Cancer Risks:    Mutations in the CHEK2 gene are associated with a moderate risk of breast and colon cancer.  Of note, the 1100delC mutation in the CHEK2 gene is common in  individuals.    The lifetime breast cancer risk for women who carry one CHEK2 mutation is approximately 24%-36%, compared to the lifetime population risk of breast cancer of 12%.      There is also an increased risk of a second breast cancer in women with a mutation in the CHEK2 gene, though the exact risk is unknown at this time.       The risk of colon cancer may be twice as high as the general population risk of colon cancer of 5%.     We also discussed the possible association of CHEK2 mutations with increased risk for prostate, melanoma, thyroid, and other cancers, however data is still limited in this area. No confirmed risk numbers are available for these additional cancers, though they have been reported in families that have a CHEK2 mutation.    Of note, we reviewed that the CHEK2 gene is currently considered a moderate-risk gene. This means that mutations in this gene increase the risk for certain cancers, but are unlikely to be the single cause for an individual's cancer. There are likely other genetic and/or environmental risk factors " that, in combination with a CHEK2 gene mutation, cause cancer.    Cancer Screening and Prevention:  The following screening is recommended for individuals who have a mutation in the CHEK2 gene, per current National Comprehensive Cancer Network (NCCN) guidelines.    Women with CHEK2 mutations qualify for high risk breast screening.  o High risk breast cancer screening recommendations include annual mammograms and annual breast MRI's, alternating every 6 months.    o It is typically recommended that this screening begin at age 40, though this can be discussed in more detail with a woman's medical providers.    Recent guidelines recommend colon cancer screening in individuals who have a mutation in the CHEK2 gene.   o Colonoscopies are done every 5 years, beginning at age 40 (or based on family history of colon cancer).      There are currently no other specific cancer screening guidelines for other cancers potentially associated with a CHEK2 mtuation. As such, additional screening should be based on family history.    Other screening based on Erwin's personal and family history:    Due to the family history of melanoma, Erwin remains at increased risk for developing melanoma. According to the American Cancer Society, Erwin is encouraged to speak to his primary care provider about having regular skin exams and safe skin practices (i.e. sunscreen, self skin exams, limited sun exposure, etc.).    We discussed that Erwin likely has some increased risk for pancreatic cancer given his mother's history, but routine screening is typically not recommended based on this family history alone. Should an additional family be diagnosed with pancreatic cancer, screening recommendations may change for Erwin and he should contact me with any changes.  Erwin is encouraged to discuss this history with his care providers.      We discussed that Erwin likely has some increased risk for thyroid cancer given his mother's  history, but routine screening is typically not recommended.  Erwin is encouraged to discuss this history with his care providers.      Due to Erwin's maternal grandmother's history of ovarian cancer, close female relatives of the family member who had ovarian cancer remain at slightly increased risk for ovarian cancer. We discussed available ovarian cancer screening (pelvic exams, CA-125 blood tests, and transvaginal ultrasounds) as well as the significant limitations of this screening. As such, this screening is not typically recommended. That being said, women in this family should discuss this screening and the signs and symptoms of ovarian cancer with their primary OB/GYN provider, as they may have individualized recommendations.    Other population cancer screening options, such as those recommended by the American Cancer Society and the National Comprehensive Cancer Network (NCCN), are also appropriate for Erwin and his family. These screening recommendations may change if there are changes to Erwin's personal and/or family history. Final screening recommendations should be made by each individual's managing physician.    We discussed that Erwin could participate in our Cancer Risk Management Program in which our nursing specialist provides an individual screening plan and assists with medical management. Erwin opted to defer this referral at this time, but did mentioned that he would be interested closer to the time when he would need to start his colonoscopies.     Of note, the above information is based on our current understanding of Erwin's genetic findings. Erwin is encouraged to reach out to me regularly regarding any pertinent updates to his personal and/or family history of cancer, as our understanding of the genetic findings in his family may change over time.     We also discussed that is important to consider that his mother or maternal grandmother could have had a mutation in one  "of the genes that Erwin tested negative for, he just didn't inherit it.     Implications for Family Members:  We reviewed that mutations in the CHEK2 gene are inherited in an autosomal dominant pattern. This means that each of Erwin's children have a 50% chance of inheriting the same mutation. Likewise, each of his siblings has a 50% risk of having the same mutation. Extended relatives may also carry this mutation, and he is encouraged to share this information with his family members on both sides of the family. I am happy to help his relatives connect with a genetic counselor in their area if they would like to discuss testing.    Additional Testing Considerations:  Even though Erwin's genetic testing result was positive, other relatives may carry a different gene mutation associated with cancers in Erwin's family. Genetic counseling is recommended for his sister and his maternal aunts and uncle to discuss genetic testing options.  If any of these relatives do pursue genetic testing, Erwin is encouraged to contact me so that we may review the impact of their test results on Erwin.      Plan:  1.  I provided Erwin with a copy of his test results and support resources today.  2.  I will provide a \"Dear Relative\" letter for Erwin to share with his family members.  3.  He plans to follow up with his primary care doctor.  4. He will contact me when he wishes to be referred to the clinical nurse specialist to discuss screening for individuals with CHEK2 mutations.    If Erwin has additional questions, I encouraged him to contact me directly at [phone].       Nini Leonard MS, [facility]  Licensed Genetic Counselor  [facility]  [phone]             "

## 2021-06-22 NOTE — PROGRESS NOTES
Assessment/ Plan     1. Cough  Patient is now had a cough for approximately 10 days that seems to be improving.  I suspect this is all a viral process as his chest x-ray was normal.  His rapid influenza was negative.  He did have a slight wheeze on exam and a history of asthma as a child.  Therefore we will have him try an albuterol inhaler.  He has an intolerance to dextromethorphan and codeine, so sent in a prescription for Tessalon Perles.  Would recommend follow-up in the next week if symptoms are not improving or if fever returns.  - Influenza A/B Rapid Test- Nasal Swab  - XR Chest 2 Views  - albuterol (PROAIR HFA;PROVENTIL HFA;VENTOLIN HFA) 90 mcg/actuation inhaler; Inhale 2 puffs every 6 (six) hours as needed for wheezing.  Dispense: 1 each; Refill: 0  - benzonatate (TESSALON) 100 MG capsule; Take 1 capsule (100 mg total) by mouth 3 (three) times a day as needed for cough.  Dispense: 21 capsule; Refill: 1      Subjective:       Erwin Mauro is a 33 y.o. male who presents for evaluation of a cough.  Patient states that symptoms started on Fedora linda.  He had a viral bug consisting of some vomiting and diarrhea.  He had a slight fever at that time.  That resolved after about 3 days and then he developed cold-like symptoms consistent with nasal congestion and a cough.  The cough has continued and has been occasionally productive.  Sometimes he is feeling some chest pain and a tight feeling in his chest.  He was feeling lightheaded but he thinks it was from the cough suppressant.  He is not having fevers anymore, but is having some night sweats.  He was concerned because last week he was in a house that had a lot of moldy food.  It was not black mold on the walls, but mainly just from food.  He states that when he was a child he had asthma and uses an inhaler, but has not needed to use anything since high school.  He is a non-smoker.  He was having a little bit of pain and pressure over his left cheek  "but that seems to improve as well.  He did get his flu shot this year.    Relevant past medical, family, surgical, and social history reviewed with patient, unless noted in HPI, not pertinent for this visit.    Review of Systems   A 12 point comprehensive review of systems was negative except as noted.      Current Outpatient Medications   Medication Sig Dispense Refill     citalopram (CELEXA) 10 MG tablet Take 1 tablet (10 mg total) by mouth daily. 90 tablet 3     EPINEPHrine (EPIPEN 2-MALACHI) 0.3 mg/0.3 mL (1:1,000) atIn Inject 0.3 mg into the shoulder, thigh, or buttocks as needed (hives/allergic reaction).        omeprazole (PRILOSEC) 20 MG capsule Take 1 capsule (20 mg total) by mouth daily as needed (indigestion/heartburn). 90 capsule 3     albuterol (PROAIR HFA;PROVENTIL HFA;VENTOLIN HFA) 90 mcg/actuation inhaler Inhale 2 puffs every 6 (six) hours as needed for wheezing. 1 each 0     benzonatate (TESSALON) 100 MG capsule Take 1 capsule (100 mg total) by mouth 3 (three) times a day as needed for cough. 21 capsule 1     No current facility-administered medications for this visit.        Objective:      /78   Pulse 60   Temp 98.5  F (36.9  C)   Resp 16   Ht 5' 11\" (1.803 m)   Wt (!) 232 lb (105.2 kg)   SpO2 98%   BMI 32.36 kg/m        General appearance: alert, appears stated age and cooperative  Head: Normocephalic, without obvious abnormality, atraumatic  Eyes: conjunctivae/corneas clear. PERRL, EOM's intact. Fundi benign.  Ears: normal TM's and external ear canals both ears  Nose: Nares normal. Septum midline. Mucosa normal. No drainage or sinus tenderness.  Throat: lips, mucosa, and tongue normal; teeth and gums normal  Neck: no adenopathy  Lungs: clear to auscultation bilaterally with just a slight expiratory wheeze bilaterally  Heart: regular rate and rhythm, S1, S2 normal, no murmur, click, rub or gallop    Chest x-ray: Personally reviewed, normal    Recent Results (from the past 168 hour(s)) "   Influenza A/B Rapid Test- Nasal Swab   Result Value Ref Range    Influenza  A, Rapid Antigen No Influenza A antigen detected No Influenza A antigen detected    Influenza B, Rapid Antigen No Influenza B antigen detected No Influenza B antigen detected          This note has been dictated using voice recognition software. Any grammatical or context distortions are unintentional and inherent to the software

## 2021-07-03 NOTE — ADDENDUM NOTE
Addendum Note by Katrin Jose at 12/18/2020 11:59 PM     Author: Katrin Jose Service: -- Author Type: --    Filed: 12/23/2020 11:23 AM Encounter Date: 12/18/2020 Status: Signed    : Katrin Jose    Addended by: KATRIN JOSE on: 12/23/2020 11:23 AM        Modules accepted: Orders

## 2021-07-03 NOTE — ADDENDUM NOTE
Addendum Note by Tenisha Manrique MD at 3/5/2020  3:45 PM     Author: Tenisha Manrique MD Service: -- Author Type: Physician    Filed: 3/5/2020  3:45 PM Encounter Date: 3/4/2020 Status: Signed    : Tenisha Manrique MD (Physician)    Addended by: TENISHA MANRIQUE on: 3/5/2020 03:45 PM        Modules accepted: Orders

## 2021-10-16 ENCOUNTER — HEALTH MAINTENANCE LETTER (OUTPATIENT)
Age: 36
End: 2021-10-16

## 2021-11-19 ENCOUNTER — TRANSFERRED RECORDS (OUTPATIENT)
Dept: HEALTH INFORMATION MANAGEMENT | Facility: CLINIC | Age: 36
End: 2021-11-19
Payer: COMMERCIAL

## 2022-06-07 ENCOUNTER — MYC MEDICAL ADVICE (OUTPATIENT)
Dept: FAMILY MEDICINE | Facility: CLINIC | Age: 37
End: 2022-06-07
Payer: COMMERCIAL

## 2022-06-07 DIAGNOSIS — F41.9 ANXIETY: ICD-10-CM

## 2022-06-08 RX ORDER — CITALOPRAM HYDROBROMIDE 10 MG/1
10 TABLET ORAL DAILY
Qty: 90 TABLET | Refills: 0 | Status: SHIPPED | OUTPATIENT
Start: 2022-06-08 | End: 2022-07-20

## 2022-06-12 DIAGNOSIS — F41.9 ANXIETY: ICD-10-CM

## 2022-06-13 RX ORDER — CITALOPRAM HYDROBROMIDE 10 MG/1
TABLET ORAL
Qty: 90 TABLET | Refills: 3 | OUTPATIENT
Start: 2022-06-13

## 2022-07-18 PROBLEM — E78.5 DYSLIPIDEMIA: Status: ACTIVE | Noted: 2022-07-18

## 2022-07-19 ASSESSMENT — ENCOUNTER SYMPTOMS
DIZZINESS: 0
JOINT SWELLING: 0
DIARRHEA: 0
FEVER: 0
WEAKNESS: 0
NERVOUS/ANXIOUS: 0
SHORTNESS OF BREATH: 0
HEADACHES: 0
SORE THROAT: 0
CHILLS: 0
CONSTIPATION: 0
HEMATURIA: 0
COUGH: 0
FREQUENCY: 0
HEMATOCHEZIA: 0
PALPITATIONS: 0
ABDOMINAL PAIN: 0
ARTHRALGIAS: 0
EYE PAIN: 0
PARESTHESIAS: 0
HEARTBURN: 0
MYALGIAS: 0
NAUSEA: 0

## 2022-07-20 ENCOUNTER — OFFICE VISIT (OUTPATIENT)
Dept: FAMILY MEDICINE | Facility: CLINIC | Age: 37
End: 2022-07-20
Payer: COMMERCIAL

## 2022-07-20 VITALS
DIASTOLIC BLOOD PRESSURE: 68 MMHG | HEIGHT: 71 IN | RESPIRATION RATE: 12 BRPM | TEMPERATURE: 96.8 F | OXYGEN SATURATION: 100 % | SYSTOLIC BLOOD PRESSURE: 112 MMHG | WEIGHT: 219.38 LBS | HEART RATE: 44 BPM | BODY MASS INDEX: 30.71 KG/M2

## 2022-07-20 DIAGNOSIS — Z11.4 SCREENING FOR HIV (HUMAN IMMUNODEFICIENCY VIRUS): ICD-10-CM

## 2022-07-20 DIAGNOSIS — Z11.59 NEED FOR HEPATITIS C SCREENING TEST: ICD-10-CM

## 2022-07-20 DIAGNOSIS — F41.9 ANXIETY: ICD-10-CM

## 2022-07-20 DIAGNOSIS — Z00.00 HEALTHCARE MAINTENANCE: Primary | ICD-10-CM

## 2022-07-20 PROBLEM — E78.5 DYSLIPIDEMIA: Status: RESOLVED | Noted: 2022-07-18 | Resolved: 2022-07-20

## 2022-07-20 LAB
ALBUMIN SERPL-MCNC: 4.1 G/DL (ref 3.4–5)
ALP SERPL-CCNC: 51 U/L (ref 40–150)
ALT SERPL W P-5'-P-CCNC: 31 U/L (ref 0–70)
ANION GAP SERPL CALCULATED.3IONS-SCNC: 5 MMOL/L (ref 3–14)
AST SERPL W P-5'-P-CCNC: 19 U/L (ref 0–45)
BILIRUB SERPL-MCNC: 1.5 MG/DL (ref 0.2–1.3)
BUN SERPL-MCNC: 11 MG/DL (ref 7–30)
CALCIUM SERPL-MCNC: 9 MG/DL (ref 8.5–10.1)
CHLORIDE BLD-SCNC: 106 MMOL/L (ref 94–109)
CHOLEST SERPL-MCNC: 152 MG/DL
CO2 SERPL-SCNC: 28 MMOL/L (ref 20–32)
CREAT SERPL-MCNC: 0.91 MG/DL (ref 0.66–1.25)
FASTING STATUS PATIENT QL REPORTED: YES
GFR SERPL CREATININE-BSD FRML MDRD: >90 ML/MIN/1.73M2
GLUCOSE BLD-MCNC: 89 MG/DL (ref 70–99)
HCV AB SERPL QL IA: NONREACTIVE
HDLC SERPL-MCNC: 38 MG/DL
HIV 1+2 AB+HIV1 P24 AG SERPL QL IA: NONREACTIVE
LDLC SERPL CALC-MCNC: 102 MG/DL
NONHDLC SERPL-MCNC: 114 MG/DL
POTASSIUM BLD-SCNC: 3.9 MMOL/L (ref 3.4–5.3)
PROT SERPL-MCNC: 7.1 G/DL (ref 6.8–8.8)
SODIUM SERPL-SCNC: 139 MMOL/L (ref 133–144)
TRIGL SERPL-MCNC: 59 MG/DL

## 2022-07-20 PROCEDURE — 80053 COMPREHEN METABOLIC PANEL: CPT | Performed by: FAMILY MEDICINE

## 2022-07-20 PROCEDURE — 86803 HEPATITIS C AB TEST: CPT | Performed by: FAMILY MEDICINE

## 2022-07-20 PROCEDURE — 80061 LIPID PANEL: CPT | Performed by: FAMILY MEDICINE

## 2022-07-20 PROCEDURE — 99395 PREV VISIT EST AGE 18-39: CPT | Performed by: FAMILY MEDICINE

## 2022-07-20 PROCEDURE — 36415 COLL VENOUS BLD VENIPUNCTURE: CPT | Performed by: FAMILY MEDICINE

## 2022-07-20 PROCEDURE — 87389 HIV-1 AG W/HIV-1&-2 AB AG IA: CPT | Performed by: FAMILY MEDICINE

## 2022-07-20 RX ORDER — CITALOPRAM HYDROBROMIDE 10 MG/1
10 TABLET ORAL DAILY
Qty: 90 TABLET | Refills: 3 | Status: SHIPPED | OUTPATIENT
Start: 2022-07-20 | End: 2023-06-20

## 2022-07-20 RX ORDER — FLUTICASONE PROPIONATE 50 MCG
1 SPRAY, SUSPENSION (ML) NASAL DAILY
COMMUNITY
End: 2024-06-04

## 2022-07-20 ASSESSMENT — ASTHMA QUESTIONNAIRES
QUESTION_2 LAST FOUR WEEKS HOW OFTEN HAVE YOU HAD SHORTNESS OF BREATH: NOT AT ALL
QUESTION_3 LAST FOUR WEEKS HOW OFTEN DID YOUR ASTHMA SYMPTOMS (WHEEZING, COUGHING, SHORTNESS OF BREATH, CHEST TIGHTNESS OR PAIN) WAKE YOU UP AT NIGHT OR EARLIER THAN USUAL IN THE MORNING: NOT AT ALL
ACT_TOTALSCORE: 25
QUESTION_4 LAST FOUR WEEKS HOW OFTEN HAVE YOU USED YOUR RESCUE INHALER OR NEBULIZER MEDICATION (SUCH AS ALBUTEROL): NOT AT ALL
QUESTION_5 LAST FOUR WEEKS HOW WOULD YOU RATE YOUR ASTHMA CONTROL: COMPLETELY CONTROLLED
QUESTION_1 LAST FOUR WEEKS HOW MUCH OF THE TIME DID YOUR ASTHMA KEEP YOU FROM GETTING AS MUCH DONE AT WORK, SCHOOL OR AT HOME: NONE OF THE TIME
ACT_TOTALSCORE: 25

## 2022-07-20 NOTE — PROGRESS NOTES
"Answers for HPI/ROS submitted by the patient on 7/19/2022  Frequency of exercise:: 2-3 days/week  Getting at least 3 servings of Calcium per day:: NO  Diet:: Other  Taking medications regularly:: No  Medication side effects:: None  Bi-annual eye exam:: Yes  Dental care twice a year:: Yes  Sleep apnea or symptoms of sleep apnea:: None  abdominal pain: No  Blood in stool: No  Blood in urine: No  chest pain: No  chills: No  congestion: No  constipation: No  cough: No  diarrhea: No  dizziness: No  ear pain: No  eye pain: No  nervous/anxious: No  fever: No  frequency: No  genital sores: No  headaches: No  hearing loss: No  heartburn: No  arthralgias: No  joint swelling: No  peripheral edema: No  mood changes: No  myalgias: No  nausea: No  palpitations: No  Skin sensation changes: No  sore throat: No  urgency: No  rash: No  shortness of breath: No  visual disturbance: No  weakness: No  impotence: No  penile discharge: No  Additional concerns today:: No  Duration of exercise:: 30-45 minutes  Barriers to taking medications:: None    Assessment/Plan:     Healthcare Maintenance Exam    Fasting labs pending  Immunizations updated  Healthy lifestyle modifications discussed  Discussed self testicular exams      BMI:   Estimated body mass index is 30.6 kg/m  as calculated from the following:    Height as of this encounter: 1.803 m (5' 11\").    Weight as of this encounter: 99.5 kg (219 lb 6 oz).   Weight management plan: Discussed healthy diet and exercise guidelines          Healthcare maintenance  - REVIEW OF HEALTH MAINTENANCE PROTOCOL ORDERS  - Lipid panel reflex to direct LDL Fasting    Anxiety  refill  - citalopram (CELEXA) 10 MG tablet  Dispense: 90 tablet; Refill: 3    Screening for HIV (human immunodeficiency virus)  - HIV Antigen Antibody Combo  - Comprehensive metabolic panel (BMP + Alb, Alk Phos, ALT, AST, Total. Bili, TP)    Need for hepatitis C screening test  - Hepatitis C Screen Reflex to HCV RNA Quant and " Genotype      Patient has been advised of split billing requirements and indicates understanding: Yes    Subjective:     Erwin Mauro is a 37 year old male who presents for an annual exam. Concerns are as follows:    None -patient is overall doing very well.  He has 2 children.  His 8-year-old unfortunately has a stress fracture in his hip and is healing from that.  His youngest is going into  this year.    He has a history of anxiety and is doing very well with the Celexa.    Healthy Habits:   Regular Exercise: Yes  Sunscreen Use: yes  Healthy Diet: Yes  Dental Visits Regularly: yes  Seat Belt: yes  Sexually active: Yes  Self Testicular Exam Monthly:Yes  Colonoscopy: N/A  Lipid Profile: Yes  Glucose Screen: Yes    Immunization status: up to date.      Current Outpatient Medications   Medication Sig Dispense Refill     citalopram (CELEXA) 10 MG tablet Take 1 tablet (10 mg) by mouth daily 90 tablet 3     fluorouracil (EFUDEX) 5 % external cream PLEASE SEE ATTACHED FOR DETAILED DIRECTIONS       fluticasone (FLONASE) 50 MCG/ACT nasal spray        Past Medical History:   Diagnosis Date     Anxiety      Asthma 10/2/2007    Formatting of this note might be different from the original. 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  Family History   Problem Relation Age of Onset     Breast Cancer Mother      Ovarian Cancer Mother      Diabetes Mother      Hodgkin's lymphoma Mother 45.00     Melanoma Mother 42.00        melanoma     Lymphoma Mother 50.00        non-Hodgkins     Thyroid Cancer Mother 50.00     Pancreatic Cancer Mother 57.00     Heart Disease Maternal Grandmother      Diabetes Maternal Grandmother      Ovarian Cancer Maternal Grandmother          at 83     Diabetes Maternal  "Grandfather      Skin Cancer Maternal Grandfather         basal cell     Skin Cancer Maternal Aunt         basal cell     Colon Cancer Other 70.00        maternal grandfather's brother;  in early 70s     Colon Cancer Other 80.00        paternal grandmother's brother     Social History     Socioeconomic History     Marital status:      Spouse name: Not on file     Number of children: Not on file     Years of education: Not on file     Highest education level: Not on file   Occupational History     Not on file   Tobacco Use     Smoking status: Former Smoker     Smokeless tobacco: Never Used   Substance and Sexual Activity     Alcohol use: Not on file     Drug use: Not on file     Sexual activity: Not on file   Other Topics Concern     Not on file   Social History Narrative     Not on file     Social Determinants of Health     Financial Resource Strain: Not on file   Food Insecurity: Not on file   Transportation Needs: Not on file   Physical Activity: Not on file   Stress: Not on file   Social Connections: Not on file   Intimate Partner Violence: Not on file   Housing Stability: Not on file       Review of Systems  General:  Denies problems  Eyes:  Denies problems  Ears/Nose/Throat:  Denies problems  Cardiovascular:  Denies problems  Respiratory:  Denies problems  Gastrointestinal:  Denies problems  Genitourinary:  Denies problems  Musculoskeletal:  Denies problems  Skin:  Denies problems  Neurologic:  Denies problems  Psychiatric:  Denies problems  Endocrine:  Denies problems  Heme/Lymphatic:  Denies problems  Allergic/Immunologic:  Denies problems      Objective:      Vitals:    / 0710   BP: 112/68   Pulse: (!) 44   Resp: 12   Temp: 96.8  F (36  C)   TempSrc: Tympanic   SpO2: 100%   Weight: 99.5 kg (219 lb 6 oz)   Height: 1.803 m (5' 11\")         Physical Exam:  General Appearance: Alert, cooperative, no distress, appears stated age  Head: Normocephalic, without obvious abnormality, " atraumatic  Eyes: PERRL, conjunctiva/corneas clear, EOM's intact  Ears: Normal TM's and external ear canals, both ears   Neck: Supple, symmetrical, trachea midline, no adenopathy;  thyroid: not enlarged, symmetric, no tenderness/mass/nodules  Back: Symmetric, no curvature, ROM normal, no CVA tenderness   Lungs: Clear to auscultation bilaterally, respirations unlabored  Chest: no visible abnormalities.  Heart: Regular rate and rhythm, S1 and S2 normal, no murmur, rub, or gallop,   Abdomen: Soft, non-tender, bowel sounds active all four quadrants,  no masses, no organomegaly  Extremities: Extremities normal, atraumatic, no cyanosis or edema  Skin: Skin color, texture, turgor normal, no rashes or lesions  Lymph nodes: Cervical, supraclavicular, and axillary nodes normal  Neurologic: Normal

## 2022-10-01 ENCOUNTER — HEALTH MAINTENANCE LETTER (OUTPATIENT)
Age: 37
End: 2022-10-01

## 2023-06-16 PROBLEM — K52.9 NONINFECTIOUS GASTROENTERITIS: Status: ACTIVE | Noted: 2020-11-18

## 2023-06-16 PROBLEM — Z86.0100 HISTORY OF COLONIC POLYPS: Status: ACTIVE | Noted: 2021-02-10

## 2023-06-16 PROBLEM — K21.9 GASTROESOPHAGEAL REFLUX DISEASE WITHOUT ESOPHAGITIS: Status: ACTIVE | Noted: 2020-12-07

## 2023-06-16 PROBLEM — K63.5 POLYP OF COLON: Status: ACTIVE | Noted: 2020-11-18

## 2023-06-16 PROBLEM — K52.9 ILEITIS: Status: ACTIVE | Noted: 2023-06-16

## 2023-06-16 PROBLEM — K64.9 HEMORRHOIDS: Status: ACTIVE | Noted: 2020-11-18

## 2023-06-16 PROBLEM — R49.0 HOARSE: Status: ACTIVE | Noted: 2021-02-10

## 2023-06-16 PROBLEM — R19.7 DIARRHEA: Status: ACTIVE | Noted: 2023-06-16

## 2023-06-16 PROBLEM — D12.0 BENIGN NEOPLASM OF CECUM: Status: ACTIVE | Noted: 2020-11-20

## 2023-06-16 PROBLEM — K57.30 DIVERTICULAR DISEASE OF LARGE INTESTINE: Status: ACTIVE | Noted: 2020-11-18

## 2023-06-19 ENCOUNTER — TRANSFERRED RECORDS (OUTPATIENT)
Dept: HEALTH INFORMATION MANAGEMENT | Facility: CLINIC | Age: 38
End: 2023-06-19
Payer: COMMERCIAL

## 2023-06-20 ENCOUNTER — OFFICE VISIT (OUTPATIENT)
Dept: FAMILY MEDICINE | Facility: CLINIC | Age: 38
End: 2023-06-20
Payer: COMMERCIAL

## 2023-06-20 VITALS
SYSTOLIC BLOOD PRESSURE: 122 MMHG | TEMPERATURE: 97.4 F | RESPIRATION RATE: 12 BRPM | HEIGHT: 71 IN | BODY MASS INDEX: 28.59 KG/M2 | DIASTOLIC BLOOD PRESSURE: 80 MMHG | WEIGHT: 204.2 LBS | OXYGEN SATURATION: 100 % | HEART RATE: 52 BPM

## 2023-06-20 DIAGNOSIS — Z15.89 MONOALLELIC MUTATION OF CHEK2 GENE IN MALE PATIENT: ICD-10-CM

## 2023-06-20 DIAGNOSIS — Z15.09 MONOALLELIC MUTATION OF CHEK2 GENE IN MALE PATIENT: ICD-10-CM

## 2023-06-20 DIAGNOSIS — K21.9 GASTROESOPHAGEAL REFLUX DISEASE WITHOUT ESOPHAGITIS: ICD-10-CM

## 2023-06-20 DIAGNOSIS — Z15.03 MONOALLELIC MUTATION OF CHEK2 GENE IN MALE PATIENT: ICD-10-CM

## 2023-06-20 DIAGNOSIS — Z15.01 MONOALLELIC MUTATION OF CHEK2 GENE IN MALE PATIENT: ICD-10-CM

## 2023-06-20 DIAGNOSIS — Z00.00 ROUTINE GENERAL MEDICAL EXAMINATION AT A HEALTH CARE FACILITY: Primary | ICD-10-CM

## 2023-06-20 LAB
ALBUMIN SERPL BCG-MCNC: 4.7 G/DL (ref 3.5–5.2)
ALP SERPL-CCNC: 57 U/L (ref 40–129)
ALT SERPL W P-5'-P-CCNC: 22 U/L (ref 0–70)
ANION GAP SERPL CALCULATED.3IONS-SCNC: 9 MMOL/L (ref 7–15)
AST SERPL W P-5'-P-CCNC: 25 U/L (ref 0–45)
BILIRUB SERPL-MCNC: 1.4 MG/DL
BUN SERPL-MCNC: 9.3 MG/DL (ref 6–20)
CALCIUM SERPL-MCNC: 9.6 MG/DL (ref 8.6–10)
CHLORIDE SERPL-SCNC: 102 MMOL/L (ref 98–107)
CHOLEST SERPL-MCNC: 142 MG/DL
CREAT SERPL-MCNC: 1.05 MG/DL (ref 0.67–1.17)
DEPRECATED HCO3 PLAS-SCNC: 29 MMOL/L (ref 22–29)
GFR SERPL CREATININE-BSD FRML MDRD: >90 ML/MIN/1.73M2
GLUCOSE SERPL-MCNC: 96 MG/DL (ref 70–99)
HDLC SERPL-MCNC: 40 MG/DL
LDLC SERPL CALC-MCNC: 89 MG/DL
NONHDLC SERPL-MCNC: 102 MG/DL
POTASSIUM SERPL-SCNC: 4.2 MMOL/L (ref 3.4–5.3)
PROT SERPL-MCNC: 7 G/DL (ref 6.4–8.3)
SODIUM SERPL-SCNC: 140 MMOL/L (ref 136–145)
TRIGL SERPL-MCNC: 67 MG/DL

## 2023-06-20 PROCEDURE — 36415 COLL VENOUS BLD VENIPUNCTURE: CPT | Performed by: FAMILY MEDICINE

## 2023-06-20 PROCEDURE — 80053 COMPREHEN METABOLIC PANEL: CPT | Performed by: FAMILY MEDICINE

## 2023-06-20 PROCEDURE — 80061 LIPID PANEL: CPT | Performed by: FAMILY MEDICINE

## 2023-06-20 PROCEDURE — 99395 PREV VISIT EST AGE 18-39: CPT | Performed by: FAMILY MEDICINE

## 2023-06-20 RX ORDER — OMEPRAZOLE 40 MG/1
40 CAPSULE, DELAYED RELEASE ORAL DAILY
COMMUNITY
Start: 2023-06-19 | End: 2023-06-20

## 2023-06-20 RX ORDER — OMEPRAZOLE 40 MG/1
40 CAPSULE, DELAYED RELEASE ORAL DAILY
Qty: 90 CAPSULE | Refills: 3 | Status: SHIPPED | OUTPATIENT
Start: 2023-06-20 | End: 2024-06-04

## 2023-06-20 ASSESSMENT — ENCOUNTER SYMPTOMS
EYE PAIN: 0
DIARRHEA: 0
FEVER: 0
WEAKNESS: 0
HEARTBURN: 0
COUGH: 0
HEMATOCHEZIA: 0
NERVOUS/ANXIOUS: 0
CHILLS: 0
DYSURIA: 0
JOINT SWELLING: 0
FREQUENCY: 0
DIZZINESS: 0
ABDOMINAL PAIN: 0
PALPITATIONS: 0
HEADACHES: 0
NAUSEA: 0
SORE THROAT: 0
SHORTNESS OF BREATH: 0
PARESTHESIAS: 0
HEMATURIA: 0
CONSTIPATION: 0
ARTHRALGIAS: 0
MYALGIAS: 0

## 2023-06-20 NOTE — PROGRESS NOTES
SUBJECTIVE:   CC: Brad is an 38 year old who presents for preventative health visit.     Healthy Habits:     Getting at least 3 servings of Calcium per day:  Yes    Bi-annual eye exam:  Yes    Dental care twice a year:  Yes    Sleep apnea or symptoms of sleep apnea:  None    Diet:  Other    Frequency of exercise:  4-5 days/week    Duration of exercise:  45-60 minutes    Taking medications regularly:  Yes    Medication side effects:  None    PHQ-2 Total Score: 0    Additional concerns today:  No    Doing very well - spending time at cabin.    Able to get off of celexa - working out at gym    Today's PHQ-2 Score:       6/20/2023     8:54 AM   PHQ-2 ( 1999 [employer])   Q1: Little interest or pleasure in doing things 0   Q2: Feeling down, depressed or hopeless 0   PHQ-2 Score 0   Q1: Little interest or pleasure in doing things Not at all   Q2: Feeling down, depressed or hopeless Not at all   PHQ-2 Score 0       Have you ever done Advance Care Planning? (For example, a Health Directive, POLST, or a discussion with a medical provider or your loved ones about your wishes): Yes, advance care planning is on file.    Social History     Tobacco Use     Smoking status: Former     Smokeless tobacco: Never   Vaping Use     Vaping status: Not on file   Substance Use Topics     Alcohol use: Not on file             6/20/2023     8:54 AM   Alcohol Use   Prescreen: >3 drinks/day or >7 drinks/week? No       Last PSA: No results found for: PSA    Reviewed orders with patient. Reviewed health maintenance and updated orders accordingly - Yes  Lab work is in process    Reviewed and updated as needed this visit by clinical staff   Tobacco  Allergies  Meds              Reviewed and updated as needed this visit by Provider                     Review of Systems   Constitutional: Negative for chills and fever.   HENT: Negative for congestion, ear pain, hearing loss and sore throat.    Eyes: Negative for pain and visual disturbance.  "  Respiratory: Negative for cough and shortness of breath.    Cardiovascular: Negative for chest pain, palpitations and peripheral edema.   Gastrointestinal: Negative for abdominal pain, constipation, diarrhea, heartburn, hematochezia and nausea.   Genitourinary: Negative for dysuria, frequency, genital sores, hematuria, impotence, penile discharge and urgency.   Musculoskeletal: Negative for arthralgias, joint swelling and myalgias.   Skin: Negative for rash.   Neurological: Negative for dizziness, weakness, headaches and paresthesias.   Psychiatric/Behavioral: Negative for mood changes. The patient is not nervous/anxious.        OBJECTIVE:   /80   Pulse 52   Temp 97.4  F (36.3  C) (Tympanic)   Resp 12   Ht 1.803 m (5' 11\")   Wt 92.6 kg (204 lb 3.2 oz)   SpO2 100%   BMI 28.48 kg/m      Physical Exam  Objective:  Vital signs reviewed and stable  General: No acute distress  Psych: Appropriate affect  HEENT: moist mucous membranes, pupils equal, round, reactive to light and accomodation, tympanic membranes are pearly grey bilaterally  Lymph: no cervical or supraclavicular lymphadenopathy  Cardiovascular: regular rate and rhythm with no murmur  Pulmonary: clear to auscultation bilaterally with no wheeze  Abdomen: soft, non tender, non distended with normo-active bowel sounds  Extremities: warm and well perfused with no edema  Skin: warm and dry with no rash      Diagnostic Test Results:  Labs reviewed in Epic    ASSESSMENT/PLAN:   1. Routine general medical examination at a health care facility  - COMPREHENSIVE METABOLIC PANEL; Future  - Lipid panel reflex to direct LDL Fasting; Future  - COMPREHENSIVE METABOLIC PANEL  - Lipid panel reflex to direct LDL Fasting    2. Gastroesophageal reflux disease without esophagitis  Repeat endoscopy this year  - omeprazole (PRILOSEC) 40 MG DR capsule; Take 1 capsule (40 mg) by mouth daily  Dispense: 90 capsule; Refill: 3    3. 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    Weight as of this encounter: 92.6 kg (204 lb 3.2 oz).   Weight management plan: Discussed healthy diet and exercise guidelines      He reports that he has quit smoking. He has never used smokeless tobacco.            Tenisha Manrique MD  [facility]  "

## 2023-07-18 ENCOUNTER — HOSPITAL ENCOUNTER (EMERGENCY)
Facility: HOSPITAL | Age: 38
Discharge: HOME | End: 2023-07-18
Attending: EMERGENCY MEDICINE | Admitting: EMERGENCY MEDICINE
Payer: COMMERCIAL

## 2023-07-18 VITALS
OXYGEN SATURATION: 97 % | TEMPERATURE: 99 F | DIASTOLIC BLOOD PRESSURE: 79 MMHG | WEIGHT: 207.23 LBS | HEART RATE: 83 BPM | BODY MASS INDEX: 28.9 KG/M2 | SYSTOLIC BLOOD PRESSURE: 119 MMHG | RESPIRATION RATE: 12 BRPM

## 2023-07-18 DIAGNOSIS — B02.21 HZO (HERPES ZOSTER OTICUS): Primary | ICD-10-CM

## 2023-07-18 DIAGNOSIS — B02.21 RAMSAY HUNT AURICULAR SYNDROME: ICD-10-CM

## 2023-07-18 LAB — GROUP A STREP BY PCR: NOT DETECTED

## 2023-07-18 PROCEDURE — 250N000011 HC RX IP 250 OP 636: Mod: JZ | Performed by: EMERGENCY MEDICINE

## 2023-07-18 PROCEDURE — 87651 STREP A DNA AMP PROBE: CPT | Performed by: EMERGENCY MEDICINE

## 2023-07-18 PROCEDURE — 96375 TX/PRO/DX INJ NEW DRUG ADDON: CPT

## 2023-07-18 PROCEDURE — 250N000013 HC RX MED GY IP 250 OP 250 PS 637: Performed by: EMERGENCY MEDICINE

## 2023-07-18 PROCEDURE — 99284 EMERGENCY DEPT VISIT MOD MDM: CPT | Mod: 25

## 2023-07-18 PROCEDURE — 96374 THER/PROPH/DIAG INJ IV PUSH: CPT

## 2023-07-18 PROCEDURE — 87798 DETECT AGENT NOS DNA AMP: CPT | Performed by: EMERGENCY MEDICINE

## 2023-07-18 RX ORDER — VALACYCLOVIR HYDROCHLORIDE 500 MG/1
1000 TABLET, FILM COATED ORAL ONCE
Status: COMPLETED | OUTPATIENT
Start: 2023-07-18 | End: 2023-07-18

## 2023-07-18 RX ORDER — KETOROLAC TROMETHAMINE 15 MG/ML
15 INJECTION, SOLUTION INTRAMUSCULAR; INTRAVENOUS ONCE
Status: COMPLETED | OUTPATIENT
Start: 2023-07-18 | End: 2023-07-18

## 2023-07-18 RX ORDER — GABAPENTIN 100 MG/1
100 CAPSULE ORAL ONCE
Status: COMPLETED | OUTPATIENT
Start: 2023-07-18 | End: 2023-07-18

## 2023-07-18 RX ORDER — VALACYCLOVIR HYDROCHLORIDE 1 G/1
1000 TABLET, FILM COATED ORAL 3 TIMES DAILY
Qty: 21 TABLET | Refills: 0 | Status: SHIPPED | OUTPATIENT
Start: 2023-07-18 | End: 2023-07-24

## 2023-07-18 RX ORDER — PREDNISONE 20 MG/1
80 TABLET ORAL DAILY
Qty: 16 TABLET | Refills: 0 | Status: SHIPPED | OUTPATIENT
Start: 2023-07-19 | End: 2023-07-24

## 2023-07-18 RX ORDER — POLYVINYL ALCOHOL 14 MG/ML
1 SOLUTION/ DROPS OPHTHALMIC PRN
Qty: 15 ML | Refills: 0 | Status: SHIPPED | OUTPATIENT
Start: 2023-07-18 | End: 2023-08-13

## 2023-07-18 RX ORDER — GABAPENTIN 300 MG/1
300 CAPSULE ORAL 3 TIMES DAILY
Qty: 21 CAPSULE | Refills: 0 | Status: SHIPPED | OUTPATIENT
Start: 2023-07-18 | End: 2023-07-24

## 2023-07-18 RX ORDER — ACETAMINOPHEN 500 MG
1000 TABLET ORAL 3 TIMES DAILY
Qty: 30 TABLET | Refills: 0 | Status: SHIPPED | OUTPATIENT
Start: 2023-07-18 | End: 2023-08-13

## 2023-07-18 RX ORDER — OXYCODONE HYDROCHLORIDE 5 MG/1
5 TABLET ORAL EVERY 6 HOURS PRN
Qty: 12 TABLET | Refills: 0 | Status: SHIPPED | OUTPATIENT
Start: 2023-07-18 | End: 2023-07-21

## 2023-07-18 RX ORDER — DEXAMETHASONE SODIUM PHOSPHATE 10 MG/ML
10 INJECTION, SOLUTION INTRAMUSCULAR; INTRAVENOUS ONCE
Status: COMPLETED | OUTPATIENT
Start: 2023-07-18 | End: 2023-07-18

## 2023-07-18 RX ADMIN — VALACYCLOVIR HYDROCHLORIDE 1000 MG: 500 TABLET, FILM COATED ORAL at 09:14

## 2023-07-18 RX ADMIN — GABAPENTIN 100 MG: 100 CAPSULE ORAL at 09:13

## 2023-07-18 RX ADMIN — DEXAMETHASONE SODIUM PHOSPHATE 10 MG: 10 INJECTION, SOLUTION INTRAMUSCULAR; INTRAVENOUS at 09:15

## 2023-07-18 RX ADMIN — KETOROLAC TROMETHAMINE 15 MG: 15 INJECTION INTRAMUSCULAR; INTRAVENOUS at 09:19

## 2023-07-18 ASSESSMENT — ACTIVITIES OF DAILY LIVING (ADL): ADLS_ACUITY_SCORE: 35

## 2023-07-18 NOTE — DISCHARGE INSTRUCTIONS
You can stop taking the antibiotics.    Please take your next dose of gabapentin this evening along with your next dose of valacyclovir.  Gabapentin may make you feel a little bit subdued and sleepy, your body is not getting used to this by tomorrow, I recommend stopping, especially if you are not getting significant pain control.    Please take 1000 mg of Tylenol 3 times a day, this may help keep the level of pain somewhat lower.  The prednisone helps with inflammation and pain as well in the situation.    If you notice it is difficult to completely close your left eye, please start using the eyedrops to keep the eyelid moist, and at night take a clear piece of scotch tape and gently keep the eye closed with that to prevent it from drying out or scratching against your pillowcase.    You need to be reevaluated the emergency department if you had persistent confusion, agitation that is not improved with sleep/rest.

## 2023-07-18 NOTE — ED TRIAGE NOTES
He was seen last week for an ear infections. Started on antibiotics. It got worse. He went back there yesterday and started Augmentin of which he has had a few doses. Things are getting worse with pain now in his throat and neck.

## 2023-07-18 NOTE — Clinical Note
Erwin Mauro was seen and treated in our emergency department on 7/18/2023.  He may return to work on 07/24/2023.       If you have any questions or concerns, please don't hesitate to call.      Trent Lowe MD

## 2023-07-18 NOTE — ED PROVIDER NOTES
EMERGENCY DEPARTMENT ENCOUNTER      NAME: Erwin Mauro  AGE: 38 year old male  YOB: 1985  MRN: [id number]  EVALUATION DATE & TIME: 7/18/2023  8:27 AM    PCP: Tenisha Manrique    ED PROVIDER: Trent Lowe M.D.      Chief Complaint   Patient presents with     Neck Pain     Otalgia         FINAL IMPRESSION:  1. HZO (herpes zoster oticus)    2. 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  All EKGs below represent my independent interpretation.   ED Course as of 07/18/23 1439   Tue Jul 18, 2023   0847 Patient is a 38-year-old gentleman, otherwise healthy who presents with 5 days of increasing left ear pain that is now appearing more painful surrounding the ear and the left side of the throat.  He was started on amoxicillin 4 days ago, transition to Augmentin 2 days ago with no improvement.  On exam here he has erythematous left TM with couple of small vesicles.  He also has recently developed a patch of erythema and vesicles around his mastoid process.  His left tonsil is erythematous with couple of small ulcerations.  I am suspicious patient has developed herpes zoster oticus.  To give IV Toradol, Decadron, with oral gabapentin, and 1 g of valacyclovir.  We will swab for strep as well as herpes zoster on the tonsil lesion, otherwise none none of the areas would be amenable to being deroofed.  He reported being mildly confused last night, but he did seem aware of this episode, it is unlikely to be delirium, and he has no signs of encephalitis currently.   0849 I will reach out to neurology to see if lumbar puncture is indicated   0907 I spoke to Dr. Barraza with neurology, she is reassured by the patient's current level of alertness and like myself could attribute his intermittent confusion last night due to pain and lack of sleep.     At this time I do not think patient needs lumbar puncture or head  imaging.  She was discharged with prednisone, valacyclovir, gabapentin and a couple doses of oxycodone as needed for breakthrough pain.    With his very mild left facial droop, he is also sent home with LiquiTears drops for his left eye and instructed to tape the eye shut at night if it becomes red or unable to fully close.  I recommended primary care follow-up within a week.  I also discussed return precautions with his significant other who was in the room which would include confusion, agitation or being overly sedated.      Additional ED Course Timestamps:  8:28 AM I met with the patient to gather history and to perform my initial exam. We discussed plans for the ED course, including diagnostic testing and treatment.   8:56 AM I spoke with Dr. Luis F pineda from neurology.   9:09 AM I rechecked and updated the patient with results. Not planning on lumbar puncture.   10:08 AM I rechecked and updated the patient with results.    Medical Decision Making    History:    Supplemental history from: Documented in chart, if applicable    External Record(s) reviewed: Documented in chart, if applicable.    Work Up:    Chart documentation includes differential considered and any EKGs or imaging independently interpreted by provider, where specified.    In additional to work up documented, I considered the following work up: Documented in chart, if applicable.    External consultation:    Discussion of management with another provider: Documented in chart, if applicable    Complicating factors:    Care impacted by chronic illness: N/A    Care affected by social determinants of health: N/A    Disposition considerations: Discharge. I recommended discontinuing prescription strength medication(s) as charted. N/A.      At the conclusion of the encounter I discussed the results of all of the tests and the disposition. The questions were answered. The patient or family acknowledged understanding and was agreeable with the care  plan.       MEDICATIONS GIVEN IN THE EMERGENCY:  Medications   valACYclovir (VALTREX) tablet 1,000 mg (1,000 mg Oral $Given 7/18/23 0914)   dexamethasone PF (DECADRON) injection 10 mg (10 mg Intravenous $Given 7/18/23 0915)   gabapentin (NEURONTIN) capsule 100 mg (100 mg Oral $Given 7/18/23 0913)   ketorolac (TORADOL) injection 15 mg (15 mg Intravenous $Given 7/18/23 0919)         NEW PRESCRIPTIONS STARTED AT TODAY'S ER VISIT  Discharge Medication List as of 7/18/2023 10:32 AM      START taking these medications    Details   acetaminophen (TYLENOL) 500 MG tablet Take 2 tablets (1,000 mg) by mouth 3 times daily, Disp-30 tablet, R-0, E-Prescribe      gabapentin (NEURONTIN) 300 MG capsule Take 1 capsule (300 mg) by mouth 3 times daily for 7 days, Disp-21 capsule, R-0, E-Prescribe      oxyCODONE (ROXICODONE) 5 MG tablet Take 1 tablet (5 mg) by mouth every 6 hours as needed for pain, Disp-12 tablet, R-0, E-Prescribe      polyvinyl alcohol (LIQUIFILM TEARS) 1.4 % ophthalmic solution Place 1 drop Into the left eye as needed for dry eyes, Disp-15 mL, R-0, Local Print      predniSONE (DELTASONE) 20 MG tablet Take 4 tablets (80 mg) by mouth daily for 4 days, Disp-16 tablet, R-0, E-Prescribe      valACYclovir (VALTREX) 1000 mg tablet Take 1 tablet (1,000 mg) by mouth 3 times daily for 7 days, Disp-21 tablet, R-0, E-Prescribe                =================================================================    HPI    Patient information was obtained from: Patient and patient's wife    Use of : N/A    Erwin Mauro is a 38 year old male with a limited pertinent history who presents to this ED for evaluation of ear pain.     Per chart review, patient was seen at Urgent Care on 7/14/23 for ear pain. Patient discharged on amoxicillin.     Per chart review, patient was seen at Urgent Care on 7/16/23 for ear pain. Discharged home on Augmentin.     Patient reports five days of left ear pain. He was initially started on  "amoxicillin and then Augmentin. Last night patient's ear pain became progressively worse with associated neck stiffness, sore throat, left ear swelling, and subjective fevers. He has been taking Tylenol for pain. Of note, patient had chickenpox as a child.     Per patient's wife, patient became confused last night, acting \"almost delirious\". Patient is able to recall being confused and pacing around last night. Additionally, wife noted a new rash behind patient's ear this morning.      VITALS:  /79   Pulse 83   Temp 99  F (37.2  C) (Temporal)   Resp 12   Wt 94 kg (207 lb 3.7 oz)   SpO2 97%   BMI 28.90 kg/m      PHYSICAL EXAM   Constitutional: Well developed, well nourished. Comfortable appearing.  HENT: Araumatic, nose normal. Left tympanic membrane with couple small vesicles. Small patch of erythematous vesicle formation over left mastoid process. Left tonsil erythematous with three small ulcerations. Neck- Supple, gross ROM intact.   Eyes: Pupils mid-range, conjunctiva without injection, no discharge.   Respiratory: Clear to auscultation bilaterally, no respiratory distress, no wheezing, speaks full sentences easily. No cough.  Cardiovascular: Normal heart rate, regular rhythm, no murmurs.   GI: Soft, no tenderness to deep palpation in all quadrants, no masses.  Musculoskeletal: Moving all 4 extremities intentionally and without pain. No obvious deformity.  Skin: Warm, dry, no rash.  Neurologic: Alert & oriented x 3, cranial nerves grossly intact. Mild left facial droop affecting only the corner of the mouth   Psychiatric: Affect normal, cooperative.    I, Joanna Stearns am serving as a scribe to document services personally performed by Dr. Trent Lowe based on my observation and the provider's statements to me. ITrent MD attest that Joanna Stearns is acting in a scribe capacity, has observed my performance of the services and has documented them in accordance with my " direction.    Trent Lowe M.D.  Emergency Medicine  [facility] EMERGENCY DEPARTMENT  [address] [id number] [phone]  Dept: [phone]       Trent Lowe MD  07/18/23 0103

## 2023-07-21 LAB — VZV DNA SPEC QL NAA+PROBE: DETECTED

## 2023-07-22 ENCOUNTER — NURSE TRIAGE (OUTPATIENT)
Dept: NURSING | Facility: CLINIC | Age: 38
End: 2023-07-22
Payer: COMMERCIAL

## 2023-07-22 NOTE — TELEPHONE ENCOUNTER
Seen Tuesday in [place] ER.  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  Prednisone Rx given. One dose of steroid given in ER.    Paralysis continued to worsen at the beginning after ER visit.   Past two days has not progressed.  Patient requesting an additional two days of prednisone to cover him until his follow up appt on 7/24/23.  I offered to call the ER and speak to one of the ER providers.    I called the ER and spoke to Dr Castillo.    Per Dr Castillo, patient has been given the recommended dose of prednisone/dexamethasone. Paralysis has not progressed now for two days and this was the intent of the steroid.  Patient's paralysis at current state may stay this way for an undetermined period of time.    I gave Brad this information.  He stated understanding.  I advised he keep his follow-up appt on Monday.  I asked that he call back with further questions or concerns.  Caller stated understanding and agreement.    Lola GUEVARA RN [facility]

## 2023-07-24 ENCOUNTER — TELEPHONE (OUTPATIENT)
Dept: FAMILY MEDICINE | Facility: CLINIC | Age: 38
End: 2023-07-24

## 2023-07-24 ENCOUNTER — OFFICE VISIT (OUTPATIENT)
Dept: FAMILY MEDICINE | Facility: CLINIC | Age: 38
End: 2023-07-24
Payer: COMMERCIAL

## 2023-07-24 VITALS
RESPIRATION RATE: 16 BRPM | WEIGHT: 199.56 LBS | OXYGEN SATURATION: 99 % | HEIGHT: 71 IN | SYSTOLIC BLOOD PRESSURE: 113 MMHG | TEMPERATURE: 99 F | BODY MASS INDEX: 27.94 KG/M2 | DIASTOLIC BLOOD PRESSURE: 76 MMHG | HEART RATE: 68 BPM

## 2023-07-24 DIAGNOSIS — B02.21 RAMSAY HUNT SYNDROME (GENICULATE HERPES ZOSTER): Primary | ICD-10-CM

## 2023-07-24 PROBLEM — R49.0 HOARSE: Status: RESOLVED | Noted: 2021-02-10 | Resolved: 2023-07-24

## 2023-07-24 PROBLEM — K52.9 NONINFECTIOUS GASTROENTERITIS: Status: RESOLVED | Noted: 2020-11-18 | Resolved: 2023-07-24

## 2023-07-24 PROCEDURE — 99214 OFFICE O/P EST MOD 30 MIN: CPT | Performed by: FAMILY MEDICINE

## 2023-07-24 RX ORDER — VALACYCLOVIR HYDROCHLORIDE 1 G/1
1000 TABLET, FILM COATED ORAL 3 TIMES DAILY
Qty: 9 TABLET | Refills: 0 | Status: SHIPPED | OUTPATIENT
Start: 2023-07-24 | End: 2024-06-04

## 2023-07-24 RX ORDER — PREDNISONE 10 MG/1
TABLET ORAL
Qty: 32 TABLET | Refills: 0 | Status: SHIPPED | OUTPATIENT
Start: 2023-07-24 | End: 2023-08-09

## 2023-07-24 RX ORDER — GABAPENTIN 300 MG/1
300 CAPSULE ORAL 3 TIMES DAILY PRN
Qty: 90 CAPSULE | Refills: 0 | Status: SHIPPED | OUTPATIENT
Start: 2023-07-24 | End: 2024-06-04

## 2023-07-24 ASSESSMENT — ENCOUNTER SYMPTOMS: CONSTITUTIONAL NEGATIVE: 1

## 2023-07-24 NOTE — TELEPHONE ENCOUNTER
General Call    Contacts         Type Contact Phone/Fax    07/24/2023 05:00 PM CDT Phone (Incoming) [facility] [place] - [address] (Pharmacy) [phone]          Reason for Call: Prescription clarification    What are your questions or concerns:  Pharmacy calling for clarification on valcyclovir prescription. They would like to know if they are dispensing 9 or 10 days.     Patient is waiting at pharmacy.    Date of last appointment with provider: Today

## 2023-07-24 NOTE — TELEPHONE ENCOUNTER
Called pharmacy and stated that the original RX was for 7 days and this next rx was a 3 day extension for a total treatment time of 10 days

## 2023-07-25 NOTE — ASSESSMENT & PLAN NOTE
This patient presents with Stacey Hunt syndrome.  He is nearing completion of antiviral therapy.  He has had worsening symptoms of pain since concluding the weight-based steroid burst as prescribed by the emergency department physician.  No new lesions recently.  We do lengthy discussion regarding his condition.  Given that he had symptoms at 1 point consistent with encephalopathy and that he has had some fatigue since then I suggested that we extend his antiviral therapy for a full 10 days.  This was sent to pharmacy.  Have a pentene was represcribed for pain.  Lastly, given that he felt quite bit better we had a lengthy risk-benefit conversation with respect to steroid therapy.  We will provide a burst and taper.  We discussed that this is not part of the typical treatment for this condition.  Lastly, we discussed the expected duration of symptoms with respect to the Bell's palsy.  His exam is quite pronounced and he struggles to close his eye.  He is using eyedrops.  Close follow-up recommended.  If he has symptoms of confusion he should be brought to the emergency department.  This was discussed in detail.

## 2023-07-31 ENCOUNTER — MYC MEDICAL ADVICE (OUTPATIENT)
Dept: FAMILY MEDICINE | Facility: CLINIC | Age: 38
End: 2023-07-31
Payer: COMMERCIAL

## 2023-07-31 DIAGNOSIS — B02.21 RAMSAY HUNT SYNDROME (GENICULATE HERPES ZOSTER): Primary | ICD-10-CM

## 2023-07-31 DIAGNOSIS — G51.0 BELL'S PALSY: ICD-10-CM

## 2023-08-11 ENCOUNTER — TRANSFERRED RECORDS (OUTPATIENT)
Dept: HEALTH INFORMATION MANAGEMENT | Facility: CLINIC | Age: 38
End: 2023-08-11
Payer: COMMERCIAL

## 2023-08-13 PROBLEM — K52.9 ILEITIS: Status: RESOLVED | Noted: 2023-06-16 | Resolved: 2023-08-13

## 2023-08-13 PROBLEM — R19.7 DIARRHEA: Status: RESOLVED | Noted: 2023-06-16 | Resolved: 2023-08-13

## 2023-08-13 NOTE — PROGRESS NOTES
NEUROLOGY CONSULTATION NOTE       [facility] NEUROLOGY [place]  [address] [place] [address]  Tel: [phone]  Fax: [phone]  [email or web address]     Erwin Mauro,  1985, MRN [id number]  PCP: Tenisha Manrique  Date: 2023     ASSESSMENT & PLAN     Visit Diagnosis  Stacey Hunt syndrome (geniculate herpes zoster)     Left Stacey Madrigal syndrome  38-year-old male without significant past medical history with left [place] Hunt syndrome.  He has made significant improvement and has minimal weakness on the left side of the face and I expect him to make full recovery.  I have recommended:    1.  Check MRI brain  2.  Use eye patch at night  3.  Continue to use gabapentin as needed.  Once pain settles down he can discontinue gabapentin  4.  I reassured him that the vast majority of patients with [place] Hunt syndrome tend to recover completely and I expect his Bell's palsy not to leave any residual.  5.  Follow-up will be in as needed basis    Thank you again for this referral, please feel free to contact me if you have any questions.    Robert Burns MD  [facility]  (Formerly, [facility]     REASON FOR CONSULTATION Neurologic Problem        HISTORY OF PRESENT ILLNESS     We have been requested by Dr. Manrique to evaluate Erwin aMuro who is a 38 year old  male for the Hunt syndrome    Patient is a 38-year-old male without significant past medical history who was initially seen in urgent care on 2023 for ear pain and was discharged on amoxicillin.  He was seen again on 2023 with persistent symptoms and was switched to Augmentin.  He developed neck stiffness, sore throat, left ear swelling and fever and developed vesicles on the left mastoid and left tonsil along with left facial droop.  He was seen in the emergency room and diagnosed with Stacey Hunt syndrome he was started on gabapentin,  Valtrex and Decadron and discharged.  He subsequently saw ENT and they felt his exam was normal.  He reports gradual improvement in his symptoms although he still has difficulty closing his left eye.  His major issue is intermittent sharp pain behind his ear for which he takes gabapentin.  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    Surgical History:  He  has a past surgical history that includes Mole Removal and other surgical history.     SOCIAL HISTORY     Reviewed, and he  reports that he has quit smoking. He has never used smokeless tobacco.     FAMILY HISTORY     Reviewed, and family history includes Breast Cancer in his mother; Colon Cancer (age of onset: 70.00) in an other family member; Colon Cancer (age of onset: 80.00) in an other family member; Diabetes in his maternal grandfather, maternal grandmother, and mother; Heart Disease in his maternal grandmother; Hodgkin's lymphoma (age of onset: 45.00) in his mother; Lymphoma (age of onset: 50.00) in his mother; Melanoma (age of onset: 42.00) in his mother; Ovarian Cancer in his maternal grandmother and mother; Pancreatic  Cancer (age of onset: 57.00) in his mother; Skin Cancer in his maternal aunt and maternal grandfather; Thyroid Cancer (age of onset: 50.00) in his mother.     ALLERGIES     No Known Allergies      REVIEW OF SYSTEMS     A 12 point review of system was performed and was negative except as outlined in the history of present illness.     HOME MEDICATIONS     Current Outpatient Rx   Medication Sig Dispense Refill    fluticasone (FLONASE) 50 MCG/ACT nasal spray Spray 1 spray into both nostrils daily      gabapentin (NEURONTIN) 300 MG capsule Take 1 capsule (300 mg) by mouth 3 times daily as needed for neuropathic pain (You may take up to 900 mg at bedtime for shingles related pain) 90 capsule 0    omeprazole (PRILOSEC) 40 MG DR capsule Take 1 capsule (40 mg) by mouth daily 90 capsule 3    valACYclovir (VALTREX) 1000 mg tablet Take 1 tablet (1,000 mg) by mouth 3 times daily for 9 doses (Extend valacyclovir for an additional three days for a total of 10) 9 tablet 0         PHYSICAL EXAM     Vital signs  /70   Pulse 55   Wt 93 kg (205 lb)   BMI 28.59 kg/m      Weight:   205 lbs 0 oz    Pleasant young male who is alert and oriented vital signs were reviewed and documented in electronic medical record.  Neck supple.  No carotid bruit thyromegaly JVD or lymphadenopathy was noted.  He has a left Bell's palsy rest of the cranial nerves are intact motor strength 5/5 reflexes symmetrical toes downgoing.  Gait normal Romberg negative     PERTINENT DIAGNOSTIC STUDIES     Following studies were reviewed:     No recent imaging studies to review     PERTINENT LABS  Following labs were reviewed:  Admission on 07/18/2023, Discharged on 07/18/2023   Component Date Value Ref Range Status    Group A strep by PCR 07/18/2023 Not Detected  Not Detected Final    Varicella Zoster DNA 07/18/2023 Detected (A)   Final   Office Visit on 06/20/2023   Component Date Value Ref Range Status    Sodium 06/20/2023 140  136 - 145 mmol/L Final     Potassium 06/20/2023 4.2  3.4 - 5.3 mmol/L Final    Chloride 06/20/2023 102  98 - 107 mmol/L Final    Carbon Dioxide (CO2) 06/20/2023 29  22 - 29 mmol/L Final    Anion Gap 06/20/2023 9  7 - 15 mmol/L Final    Urea Nitrogen 06/20/2023 9.3  6.0 - 20.0 mg/dL Final    Creatinine 06/20/2023 1.05  0.67 - 1.17 mg/dL Final    Calcium 06/20/2023 9.6  8.6 - 10.0 mg/dL Final    Glucose 06/20/2023 96  70 - 99 mg/dL Final    Alkaline Phosphatase 06/20/2023 57  40 - 129 U/L Final    AST 06/20/2023 25  0 - 45 U/L Final    ALT 06/20/2023 22  0 - 70 U/L Final    Protein Total 06/20/2023 7.0  6.4 - 8.3 g/dL Final    Albumin 06/20/2023 4.7  3.5 - 5.2 g/dL Final    Bilirubin Total 06/20/2023 1.4 (H)  <=1.2 mg/dL Final    GFR Estimate 06/20/2023 >90  >60 mL/min/1.73m2 Final    Cholesterol 06/20/2023 142  <200 mg/dL Final    Triglycerides 06/20/2023 67  <150 mg/dL Final    Direct Measure HDL 06/20/2023 40  >=40 mg/dL Final    LDL Cholesterol Calculated 06/20/2023 89  <=100 mg/dL Final    Non HDL Cholesterol 06/20/2023 102  <130 mg/dL Final        Total time spent for face to face visit, reviewing labs/imaging studies, counseling and coordination of care was: 1 Hour spent on the date of the encounter doing chart review, review of outside records, review of test results, interpretation of tests, patient visit, and documentation     This note was dictated using voice recognition software.  Any grammatical or context distortions are unintentional and inherent to the software.    Orders Placed This Encounter   Procedures    MR Brain w/o & w Contrast      New Prescriptions    No medications on file      Modified Medications    No medications on file

## 2023-08-14 ENCOUNTER — OFFICE VISIT (OUTPATIENT)
Dept: NEUROLOGY | Facility: CLINIC | Age: 38
End: 2023-08-14
Attending: FAMILY MEDICINE
Payer: COMMERCIAL

## 2023-08-14 VITALS
BODY MASS INDEX: 28.59 KG/M2 | DIASTOLIC BLOOD PRESSURE: 70 MMHG | HEART RATE: 55 BPM | WEIGHT: 205 LBS | SYSTOLIC BLOOD PRESSURE: 107 MMHG

## 2023-08-14 DIAGNOSIS — G51.0 BELL'S PALSY: ICD-10-CM

## 2023-08-14 DIAGNOSIS — B02.21 RAMSAY HUNT SYNDROME (GENICULATE HERPES ZOSTER): Primary | ICD-10-CM

## 2023-08-14 PROCEDURE — 99205 OFFICE O/P NEW HI 60 MIN: CPT | Performed by: PSYCHIATRY & NEUROLOGY

## 2023-08-14 NOTE — LETTER
2023         RE: Erwin Mauro   [facility] [address]        Dear Colleague,    Thank you for referring your patient, Erwin Mauro, to the [facility] [place]. Please see a copy of my visit note below.    NEUROLOGY CONSULTATION NOTE       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  He has made significant improvement and has minimal weakness on the left side of the face and I expect him to make full recovery.  I have recommended:    1.  Check MRI brain  2.  Use eye patch at night  3.  Continue to use gabapentin as needed.  Once pain settles down he can discontinue gabapentin  4.  I reassured him that the vast majority of patients with Stacey Hunt syndrome tend to recover completely and I expect his Bell's palsy not to leave any residual.  5.  Follow-up will be in as needed basis    Thank you again for this referral, please feel free to contact me if you have any questions.    Robert Burns MD  [facility]  (Former[facility], [facility]     REASON FOR CONSULTATION Neurologic Problem        HISTORY OF PRESENT ILLNESS     We have been requested by Dr. Manrique to evaluate Erwin Mauro who is a 38 year old  male for the Hunt syndrome    Patient is a 38-year-old male without significant past medical history who was initially seen in urgent care on 2023 for ear pain and was discharged on amoxicillin.  He was seen again on 2023 with persistent symptoms and was switched to Augmentin.   He developed neck stiffness, sore throat, left ear swelling and fever and developed vesicles on the left mastoid and left tonsil along with left facial droop.  He was seen in the emergency room and diagnosed with Stacey Hunt syndrome he was started on gabapentin, Valtrex and Decadron and discharged.  He subsequently saw ENT and they felt his exam was normal.  He reports gradual improvement in his symptoms although he still has difficulty closing his left eye.  His major issue is intermittent sharp pain behind his ear for which he takes gabapentin.  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    Surgical History:  He  has a past surgical history that includes Mole Removal and other surgical history.     SOCIAL HISTORY     Reviewed, and he  reports that he has quit smoking. He has never used smokeless tobacco.     FAMILY HISTORY     Reviewed, and family history includes Breast Cancer in his mother; Colon Cancer (age of onset: 70.00) in an other family member; Colon Cancer (age of onset: 80.00) in an other family member; Diabetes in his maternal grandfather, maternal  grandmother, and mother; Heart Disease in his maternal grandmother; Hodgkin's lymphoma (age of onset: 45.00) in his mother; Lymphoma (age of onset: 50.00) in his mother; Melanoma (age of onset: 42.00) in his mother; Ovarian Cancer in his maternal grandmother and mother; Pancreatic Cancer (age of onset: 57.00) in his mother; Skin Cancer in his maternal aunt and maternal grandfather; Thyroid Cancer (age of onset: 50.00) in his mother.     ALLERGIES     No Known Allergies      REVIEW OF SYSTEMS     A 12 point review of system was performed and was negative except as outlined in the history of present illness.     HOME MEDICATIONS     Current Outpatient Rx   Medication Sig Dispense Refill     fluticasone (FLONASE) 50 MCG/ACT nasal spray Spray 1 spray into both nostrils daily       gabapentin (NEURONTIN) 300 MG capsule Take 1 capsule (300 mg) by mouth 3 times daily as needed for neuropathic pain (You may take up to 900 mg at bedtime for shingles related pain) 90 capsule 0     omeprazole (PRILOSEC) 40 MG DR capsule Take 1 capsule (40 mg) by mouth daily 90 capsule 3     valACYclovir (VALTREX) 1000 mg tablet Take 1 tablet (1,000 mg) by mouth 3 times daily for 9 doses (Extend valacyclovir for an additional three days for a total of 10) 9 tablet 0         PHYSICAL EXAM     Vital signs  /70   Pulse 55   Wt 93 kg (205 lb)   BMI 28.59 kg/m      Weight:   205 lbs 0 oz    Pleasant young male who is alert and oriented vital signs were reviewed and documented in electronic medical record.  Neck supple.  No carotid bruit thyromegaly JVD or lymphadenopathy was noted.  He has a left Bell's palsy rest of the cranial nerves are intact motor strength 5/5 reflexes symmetrical toes downgoing.  Gait normal Romberg negative     PERTINENT DIAGNOSTIC STUDIES     Following studies were reviewed:     No recent imaging studies to review     PERTINENT LABS  Following labs were reviewed:  Admission on 07/18/2023, Discharged on 07/18/2023    Component Date Value Ref Range Status     Group A strep by PCR 07/18/2023 Not Detected  Not Detected Final     Varicella Zoster DNA 07/18/2023 Detected (A)   Final   Office Visit on 06/20/2023   Component Date Value Ref Range Status     Sodium 06/20/2023 140  136 - 145 mmol/L Final     Potassium 06/20/2023 4.2  3.4 - 5.3 mmol/L Final     Chloride 06/20/2023 102  98 - 107 mmol/L Final     Carbon Dioxide (CO2) 06/20/2023 29  22 - 29 mmol/L Final     Anion Gap 06/20/2023 9  7 - 15 mmol/L Final     Urea Nitrogen 06/20/2023 9.3  6.0 - 20.0 mg/dL Final     Creatinine 06/20/2023 1.05  0.67 - 1.17 mg/dL Final     Calcium 06/20/2023 9.6  8.6 - 10.0 mg/dL Final     Glucose 06/20/2023 96  70 - 99 mg/dL Final     Alkaline Phosphatase 06/20/2023 57  40 - 129 U/L Final     AST 06/20/2023 25  0 - 45 U/L Final     ALT 06/20/2023 22  0 - 70 U/L Final     Protein Total 06/20/2023 7.0  6.4 - 8.3 g/dL Final     Albumin 06/20/2023 4.7  3.5 - 5.2 g/dL Final     Bilirubin Total 06/20/2023 1.4 (H)  <=1.2 mg/dL Final     GFR Estimate 06/20/2023 >90  >60 mL/min/1.73m2 Final     Cholesterol 06/20/2023 142  <200 mg/dL Final     Triglycerides 06/20/2023 67  <150 mg/dL Final     Direct Measure HDL 06/20/2023 40  >=40 mg/dL Final     LDL Cholesterol Calculated 06/20/2023 89  <=100 mg/dL Final     Non HDL Cholesterol 06/20/2023 102  <130 mg/dL Final        Total time spent for face to face visit, reviewing labs/imaging studies, counseling and coordination of care was: 1 Hour spent on the date of the encounter doing chart review, review of outside records, review of test results, interpretation of tests, patient visit, and documentation     This note was dictated using voice recognition software.  Any grammatical or context distortions are unintentional and inherent to the software.    Orders Placed This Encounter   Procedures     MR Brain w/o & w Contrast      New Prescriptions    No medications on file      Modified Medications    No  medications on file                Again, thank you for allowing me to participate in the care of your patient.        Sincerely,        Robert Burns MD

## 2023-08-16 ENCOUNTER — HOSPITAL ENCOUNTER (OUTPATIENT)
Dept: MRI IMAGING | Facility: CLINIC | Age: 38
Discharge: HOME | End: 2023-08-16
Attending: PSYCHIATRY & NEUROLOGY | Admitting: PSYCHIATRY & NEUROLOGY
Payer: COMMERCIAL

## 2023-08-16 DIAGNOSIS — B02.21 RAMSAY HUNT SYNDROME (GENICULATE HERPES ZOSTER): ICD-10-CM

## 2023-08-16 DIAGNOSIS — G51.0 BELL'S PALSY: ICD-10-CM

## 2023-08-16 PROCEDURE — 255N000002 HC RX 255 OP 636: Performed by: PSYCHIATRY & NEUROLOGY

## 2023-08-16 PROCEDURE — A9585 GADOBUTROL INJECTION: HCPCS | Performed by: PSYCHIATRY & NEUROLOGY

## 2023-08-16 PROCEDURE — 70553 MRI BRAIN STEM W/O & W/DYE: CPT

## 2023-08-16 RX ORDER — GADOBUTROL 604.72 MG/ML
9 INJECTION INTRAVENOUS ONCE
Status: COMPLETED | OUTPATIENT
Start: 2023-08-16 | End: 2023-08-16

## 2023-08-16 RX ADMIN — GADOBUTROL 9 ML: 604.72 INJECTION INTRAVENOUS at 14:26

## 2023-08-17 NOTE — RESULT ENCOUNTER NOTE
Donald Salinas    MRI brain shows changes in the 7th nerve consistent with [place] Hunt syndrome.  No other abnormality noted.  No new recommendation.    Regards,  Robert Burns MD

## 2023-08-31 ENCOUNTER — TRANSFERRED RECORDS (OUTPATIENT)
Dept: HEALTH INFORMATION MANAGEMENT | Facility: CLINIC | Age: 38
End: 2023-08-31
Payer: COMMERCIAL

## 2023-10-16 ENCOUNTER — PATIENT OUTREACH (OUTPATIENT)
Dept: GASTROENTEROLOGY | Facility: CLINIC | Age: 38
End: 2023-10-16
Payer: COMMERCIAL

## 2023-12-06 ENCOUNTER — TRANSFERRED RECORDS (OUTPATIENT)
Dept: HEALTH INFORMATION MANAGEMENT | Facility: CLINIC | Age: 38
End: 2023-12-06
Payer: COMMERCIAL

## 2023-12-18 ENCOUNTER — TRANSFERRED RECORDS (OUTPATIENT)
Dept: HEALTH INFORMATION MANAGEMENT | Facility: CLINIC | Age: 38
End: 2023-12-18
Payer: COMMERCIAL

## 2024-05-29 PROBLEM — K31.9 DISORDER OF FUNCTION OF STOMACH: Status: ACTIVE | Noted: 2023-12-08

## 2024-05-29 RX ORDER — CITALOPRAM HYDROBROMIDE 10 MG/1
10 TABLET ORAL DAILY
COMMUNITY
End: 2024-06-04

## 2024-06-03 SDOH — HEALTH STABILITY: PHYSICAL HEALTH: ON AVERAGE, HOW MANY MINUTES DO YOU ENGAGE IN EXERCISE AT THIS LEVEL?: 60 MIN

## 2024-06-03 SDOH — HEALTH STABILITY: PHYSICAL HEALTH: ON AVERAGE, HOW MANY DAYS PER WEEK DO YOU ENGAGE IN MODERATE TO STRENUOUS EXERCISE (LIKE A BRISK WALK)?: 4 DAYS

## 2024-06-03 ASSESSMENT — SOCIAL DETERMINANTS OF HEALTH (SDOH): HOW OFTEN DO YOU GET TOGETHER WITH FRIENDS OR RELATIVES?: THREE TIMES A WEEK

## 2024-06-04 ENCOUNTER — OFFICE VISIT (OUTPATIENT)
Dept: FAMILY MEDICINE | Facility: CLINIC | Age: 39
End: 2024-06-04
Payer: COMMERCIAL

## 2024-06-04 VITALS
BODY MASS INDEX: 30.41 KG/M2 | OXYGEN SATURATION: 97 % | RESPIRATION RATE: 12 BRPM | SYSTOLIC BLOOD PRESSURE: 110 MMHG | HEIGHT: 71 IN | HEART RATE: 51 BPM | DIASTOLIC BLOOD PRESSURE: 64 MMHG | WEIGHT: 217.25 LBS | TEMPERATURE: 97.4 F

## 2024-06-04 DIAGNOSIS — Z86.19 HISTORY OF SHINGLES: ICD-10-CM

## 2024-06-04 DIAGNOSIS — Z00.00 ROUTINE GENERAL MEDICAL EXAMINATION AT A HEALTH CARE FACILITY: Primary | ICD-10-CM

## 2024-06-04 DIAGNOSIS — K21.9 GASTROESOPHAGEAL REFLUX DISEASE WITHOUT ESOPHAGITIS: ICD-10-CM

## 2024-06-04 LAB
ALBUMIN SERPL BCG-MCNC: 4.5 G/DL (ref 3.5–5.2)
ALP SERPL-CCNC: 53 U/L (ref 40–150)
ALT SERPL W P-5'-P-CCNC: 22 U/L (ref 0–70)
ANION GAP SERPL CALCULATED.3IONS-SCNC: 10 MMOL/L (ref 7–15)
AST SERPL W P-5'-P-CCNC: 25 U/L (ref 0–45)
BILIRUB SERPL-MCNC: 1.7 MG/DL
BUN SERPL-MCNC: 11.3 MG/DL (ref 6–20)
CALCIUM SERPL-MCNC: 9.5 MG/DL (ref 8.6–10)
CHLORIDE SERPL-SCNC: 104 MMOL/L (ref 98–107)
CHOLEST SERPL-MCNC: 137 MG/DL
CREAT SERPL-MCNC: 1.05 MG/DL (ref 0.67–1.17)
DEPRECATED HCO3 PLAS-SCNC: 27 MMOL/L (ref 22–29)
EGFRCR SERPLBLD CKD-EPI 2021: >90 ML/MIN/1.73M2
ERYTHROCYTE [DISTWIDTH] IN BLOOD BY AUTOMATED COUNT: 12.5 % (ref 10–15)
FASTING STATUS PATIENT QL REPORTED: YES
FASTING STATUS PATIENT QL REPORTED: YES
GLUCOSE SERPL-MCNC: 100 MG/DL (ref 70–99)
HCT VFR BLD AUTO: 41.4 % (ref 40–53)
HDLC SERPL-MCNC: 43 MG/DL
HGB BLD-MCNC: 13.8 G/DL (ref 13.3–17.7)
LDLC SERPL CALC-MCNC: 83 MG/DL
MCH RBC QN AUTO: 29.7 PG (ref 26.5–33)
MCHC RBC AUTO-ENTMCNC: 33.3 G/DL (ref 31.5–36.5)
MCV RBC AUTO: 89 FL (ref 78–100)
NONHDLC SERPL-MCNC: 94 MG/DL
PLATELET # BLD AUTO: 146 10E3/UL (ref 150–450)
POTASSIUM SERPL-SCNC: 4.2 MMOL/L (ref 3.4–5.3)
PROT SERPL-MCNC: 6.7 G/DL (ref 6.4–8.3)
RBC # BLD AUTO: 4.64 10E6/UL (ref 4.4–5.9)
SODIUM SERPL-SCNC: 141 MMOL/L (ref 135–145)
TRIGL SERPL-MCNC: 53 MG/DL
WBC # BLD AUTO: 6.6 10E3/UL (ref 4–11)

## 2024-06-04 PROCEDURE — 36415 COLL VENOUS BLD VENIPUNCTURE: CPT | Performed by: FAMILY MEDICINE

## 2024-06-04 PROCEDURE — 85027 COMPLETE CBC AUTOMATED: CPT | Performed by: FAMILY MEDICINE

## 2024-06-04 PROCEDURE — 90750 HZV VACC RECOMBINANT IM: CPT | Performed by: FAMILY MEDICINE

## 2024-06-04 PROCEDURE — 80053 COMPREHEN METABOLIC PANEL: CPT | Performed by: FAMILY MEDICINE

## 2024-06-04 PROCEDURE — 99396 PREV VISIT EST AGE 40-64: CPT | Mod: 25 | Performed by: FAMILY MEDICINE

## 2024-06-04 PROCEDURE — 90471 IMMUNIZATION ADMIN: CPT | Performed by: FAMILY MEDICINE

## 2024-06-04 PROCEDURE — 80061 LIPID PANEL: CPT | Performed by: FAMILY MEDICINE

## 2024-06-04 RX ORDER — OMEPRAZOLE 40 MG/1
40 CAPSULE, DELAYED RELEASE ORAL DAILY
Qty: 90 CAPSULE | Refills: 3 | Status: SHIPPED | OUTPATIENT
Start: 2024-06-04

## 2024-06-04 NOTE — PROGRESS NOTES
"Preventive Care Visit  [facility] MELO Manrique MD, Family Medicine  Jun 4, 2024      Assessment & Plan     Routine general medical examination at a health care facility  - REVIEW OF HEALTH MAINTENANCE PROTOCOL ORDERS  - PRIMARY CARE FOLLOW-UP SCHEDULING; Future  - ZOSTER RECOMBINANT ADJUVANTED (SHINGRIX)  - Lipid panel reflex to direct LDL Fasting; Future  - COMPREHENSIVE METABOLIC PANEL; Future  - CBC with Platelets; Future  - Lipid panel reflex to direct LDL Fasting  - COMPREHENSIVE METABOLIC PANEL  - CBC with Platelets    History of shingles  On face - will benefit medically from screen  - ZOSTER RECOMBINANT ADJUVANTED (SHINGRIX)    Gastroesophageal reflux disease without esophagitis  refill  - omeprazole (PRILOSEC) 40 MG DR capsule; Take 1 capsule (40 mg) by mouth daily    Patient has been advised of split billing requirements and indicates understanding: Yes        BMI  Estimated body mass index is 30.41 kg/m  as calculated from the following:    Height as of this encounter: 1.8 m (5' 10.87\").    Weight as of this encounter: 98.5 kg (217 lb 4 oz).   Weight management plan: Discussed healthy diet and exercise guidelines - very active - lifts weights    Counseling  Appropriate preventive services were discussed with this patient, including applicable screening as appropriate for fall prevention, nutrition, physical activity, Tobacco-use cessation, weight loss and cognition.  Checklist reviewing preventive services available has been given to the patient.  Reviewed patient's diet, addressing concerns and/or questions.           Radha Salinas is a 39 year old, presenting for the following:  Physical (Fasting for labs today- refill on his medication/Discuss shingles vaccine)       Health Care Directive  Patient does not have a Health Care Directive or Living Will: Discussed advance care planning with patient; however, patient declined at this time.    HPI  Doing well.  Weight " lifting.              6/3/2024   General Health   How would you rate your overall physical health? Excellent   Feel stress (tense, anxious, or unable to sleep) Not at all         6/3/2024   Nutrition   Three or more servings of calcium each day? Yes   Diet: Regular (no restrictions)   How many servings of fruit and vegetables per day? (!) 2-3   How many sweetened beverages each day? 0-1         6/3/2024   Exercise   Days per week of moderate/strenous exercise 4 days   Average minutes spent exercising at this level 60 min         6/3/2024   Social Factors   Frequency of gathering with friends or relatives Three times a week   Worry food won't last until get money to buy more No   Food not last or not have enough money for food? No   Do you have housing?  Yes   Are you worried about losing your housing? No   Lack of transportation? No   Unable to get utilities (heat,electricity)? No         6/3/2024   Dental   Dentist two times every year? Yes         6/3/2024   TB Screening   Were you born outside of the [place]? No         Today's PHQ-2 Score:       6/3/2024     9:11 AM   PHQ-2 ( 1999 [employer])   Q1: Little interest or pleasure in doing things 0   Q2: Feeling down, depressed or hopeless 0   PHQ-2 Score 0   Q1: Little interest or pleasure in doing things Not at all   Q2: Feeling down, depressed or hopeless Not at all   PHQ-2 Score 0           6/3/2024   Substance Use   Alcohol more than 3/day or more than 7/wk No   Do you use any other substances recreationally? No     Social History     Tobacco Use    Smoking status: Former    Smokeless tobacco: Never   Vaping Use    Vaping status: Never Used           6/3/2024   STI Screening   New sexual partner(s) since last STI/HIV test? No   ASCVD Risk   The 10-year ASCVD risk score (Bao HOWELL, et al., 2019) is: 0.6%    Values used to calculate the score:      Age: 39 years      Sex: Male      Is Non- : No      Diabetic: No      Tobacco smoker: No       "Systolic Blood Pressure: 110 mmHg      Is BP treated: No      HDL Cholesterol: 40 mg/dL      Total Cholesterol: 142 mg/dL        6/3/2024   Contraception/Family Planning   Questions about contraception or family planning No        Reviewed and updated as needed this visit by Provider                    Past Medical History:   Diagnosis Date    Anxiety     Asthma 10/2/2007    Formatting of this note might be different from the original. 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     Objective    Exam  /64   Pulse 51   Temp 97.4  F (36.3  C) (Tympanic)   Resp 12   Ht 1.8 m (5' 10.87\")   Wt 98.5 kg (217 lb 4 oz)   SpO2 97%   BMI 30.41 kg/m     Estimated body mass index is 30.41 kg/m  as calculated from the following:    Height as of this encounter: 1.8 m (5' 10.87\").    Weight as of this encounter: 98.5 kg (217 lb 4 oz).    Physical Exam  Objective:  Vital signs reviewed and stable  General: No acute distress  Psych: Appropriate affect  HEENT: moist mucous membranes, pupils equal, round, reactive to light and accomodation, tympanic membranes are pearly grey bilaterally  Lymph: no cervical or supraclavicular lymphadenopathy  Cardiovascular: regular rate and rhythm with no murmur  Pulmonary: clear to auscultation bilaterally with no wheeze  Abdomen: soft, non tender, non distended with normo-active bowel sounds  Extremities: warm and well perfused with no edema  Skin: warm and dry with no rash          Signed Electronically by: Tenisha Manrique MD    "

## 2024-06-04 NOTE — PATIENT INSTRUCTIONS
"Preventive Care Advice   This is general advice we often give to help people stay healthy. Your care team may have specific advice just for you. Please talk to your care team about your own preventive care needs.  Lifestyle  Exercise at least 150 minutes each week (30 minutes a day, 5 days a week).  Do muscle strengthening activities 2 days a week. These help control your weight and prevent disease.  No smoking.  Wear sunscreen to prevent skin cancer.  Have your home tested for radon every 2 to 5 years. Radon is a colorless, odorless gas that can harm your lungs. To learn more, go to www.health.[facility].[place]. and search for \"Radon in Homes.\"  Keep guns unloaded and locked up in a safe place like a safe or gun vault, or, use a gun lock and hide the keys. Always lock away bullets separately. To learn more, visit [employer].[place].gov and search for \"safe gun storage.\"  Nutrition  Eat 5 or more servings of fruits and vegetables each day.  Try wheat bread, brown rice and whole grain pasta (instead of white bread, rice, and pasta).  Get enough calcium and vitamin D. Check the label on foods and aim for 100% of the RDA (recommended daily allowance).  Regular exams  Have a dental exam and cleaning every 6 months.  See your health care team every year to talk about:  Any changes in your health.  Any medicines your care team has prescribed.  Preventive care, family planning, and ways to prevent chronic diseases.  Shots (vaccines)   HPV shots (up to age 26), if you've never had them before.  Hepatitis B shots (up to age 59), if you've never had them before.  COVID-19 shot: Get this shot when it's due.  Flu shot: Get a flu shot every year.  Tetanus shot: Get a tetanus shot every 10 years.  Pneumococcal, hepatitis A, and RSV shots: Ask your care team if you need these based on your risk.  Shingles shot (for age 50 and up).  General health tests  Diabetes screening:  Starting at age 35, Get screened for diabetes at least every 3 years.  If " you are younger than age 35, ask your care team if you should be screened for diabetes.  Cholesterol test: At age 39, start having a cholesterol test every 5 years, or more often if advised.  Bone density scan (DEXA): At age 50, ask your care team if you should have this scan for osteoporosis (brittle bones).  Hepatitis C: Get tested at least once in your life.  Abdominal aortic aneurysm screening: Talk to your doctor about having this screening if you:  Have ever smoked; and  Are biologically male; and  Are between the ages of 65 and 75.  STIs (sexually transmitted infections)  Before age 24: Ask your care team if you should be screened for STIs.  After age 24: Get screened for STIs if you're at risk. You are at risk for STIs (including HIV) if:  You are sexually active with more than one person.  You don't use condoms every time.  You or a partner was diagnosed with a sexually transmitted infection.  If you are at risk for HIV, ask about PrEP medicine to prevent HIV.  Get tested for HIV at least once in your life, whether you are at risk for HIV or not.  Cancer screening tests  Cervical cancer screening: If you have a cervix, begin getting regular cervical cancer screening tests at age 21. Most people who have regular screenings with normal results can stop after age 65. Talk about this with your provider.  Breast cancer scan (mammogram): If you've ever had breasts, begin having regular mammograms starting at age 40. This is a scan to check for breast cancer.  Colon cancer screening: It is important to start screening for colon cancer at age 45.  Have a colonoscopy test every 10 years (or more often if you're at risk) Or, ask your provider about stool tests like a FIT test every year or Cologuard test every 3 years.  To learn more about your testing options, visit: www.Qwbcg/437673.pdf.  For help making a decision, visit: meagan/gg78469.  Prostate cancer screening test: If you have a prostate and are age 55  to 69, ask your provider if you would benefit from a yearly prostate cancer screening test.  Lung cancer screening: If you are a current or former smoker age 50 to 80, ask your care team if ongoing lung cancer screenings are right for you.  For informational purposes only. Not to replace the advice of your health care provider. Copyright   2023 [employer]. All rights reserved. Clinically reviewed by the [facility] Transitions Program. [employer] [id number] - REV 04/24.

## 2024-09-12 ENCOUNTER — TELEPHONE (OUTPATIENT)
Dept: FAMILY MEDICINE | Facility: CLINIC | Age: 39
End: 2024-09-12
Payer: COMMERCIAL

## 2024-09-12 NOTE — TELEPHONE ENCOUNTER
Received message from [employer] customer service. Patient is unable to schedule shingles vaccine through [employer].  Advised patient to call clinic to schedule. Please place order for shingles vaccine. Due to his age we are unable to give without order.  Thank you!  Ivis Martinez, [facility]

## 2024-09-23 PROCEDURE — 90471 IMMUNIZATION ADMIN: CPT | Performed by: FAMILY MEDICINE

## 2024-09-23 PROCEDURE — 90750 HZV VACC RECOMBINANT IM: CPT | Performed by: FAMILY MEDICINE

## 2024-09-23 NOTE — TELEPHONE ENCOUNTER
I had to defer the vaccine in order to sign this encounter. Ok for shingles vaccine, see below.  Ivis Martinez, CMA

## 2025-04-28 ENCOUNTER — TRANSFERRED RECORDS (OUTPATIENT)
Dept: HEALTH INFORMATION MANAGEMENT | Facility: CLINIC | Age: 40
End: 2025-04-28
Payer: COMMERCIAL

## 2025-05-05 ENCOUNTER — PATIENT OUTREACH (OUTPATIENT)
Dept: CARE COORDINATION | Facility: CLINIC | Age: 40
End: 2025-05-05
Payer: COMMERCIAL

## 2025-05-12 ENCOUNTER — TRANSFERRED RECORDS (OUTPATIENT)
Dept: HEALTH INFORMATION MANAGEMENT | Facility: CLINIC | Age: 40
End: 2025-05-12
Payer: COMMERCIAL

## 2025-05-19 ENCOUNTER — ALLIED HEALTH/NURSE VISIT (OUTPATIENT)
Dept: FAMILY MEDICINE | Facility: CLINIC | Age: 40
End: 2025-05-19
Payer: COMMERCIAL

## 2025-05-19 DIAGNOSIS — Z23 ENCOUNTER FOR IMMUNIZATION: Primary | ICD-10-CM

## 2025-05-19 PROCEDURE — 90750 HZV VACC RECOMBINANT IM: CPT

## 2025-05-19 PROCEDURE — 90471 IMMUNIZATION ADMIN: CPT

## 2025-05-19 NOTE — PROGRESS NOTES
Prior to immunization administration, verified patients identity using patient s name and date of birth. Please see Immunization Activity for additional information.     Is the patient's temperature normal (100.5 or less)? Yes     Patient MEETS CRITERIA. PROCEED with vaccine administration.      Patient instructed to remain in clinic for 15 minutes afterwards, and to report any adverse reactions.      Link to Ancillary Visit Immunization Standing Orders SmartSet     Screening performed by Elysia Arguello MA on 5/19/2025 at 9:30 AM.

## 2025-05-21 PROBLEM — D18.01 HEMANGIOMA OF SKIN AND SUBCUTANEOUS TISSUE: Status: ACTIVE | Noted: 2024-02-07

## 2025-05-21 PROBLEM — L90.5 SCAR CONDITIONS AND FIBROSIS OF SKIN: Status: ACTIVE | Noted: 2025-04-28

## 2025-05-21 PROBLEM — L57.0 ACTINIC KERATOSIS: Status: ACTIVE | Noted: 2023-02-27

## 2025-05-21 PROBLEM — D22.71 MELANOCYTIC NEVUS OF RIGHT LOWER EXTREMITY: Status: ACTIVE | Noted: 2023-04-24

## 2025-05-21 PROBLEM — C44.91 BASAL CELL CARCINOMA (BCC): Status: ACTIVE | Noted: 2023-02-27

## 2025-05-21 PROBLEM — D22.5 MELANOCYTIC NEVUS OF TRUNK: Status: ACTIVE | Noted: 2021-05-03

## 2025-05-21 PROBLEM — L82.1 SEBORRHEIC KERATOSIS: Status: ACTIVE | Noted: 2024-02-07

## 2025-05-21 PROBLEM — Z80.9 FAMILY HISTORY OF MALIGNANT NEOPLASM: Status: ACTIVE | Noted: 2022-03-31

## 2025-05-21 PROBLEM — D23.9 DYSPLASTIC NEVUS: Status: ACTIVE | Noted: 2023-02-27

## 2025-05-21 PROBLEM — L98.9 INFLAMMATORY DERMATOSIS: Status: ACTIVE | Noted: 2021-05-03

## 2025-05-21 PROBLEM — D22.61 MELANOCYTIC NEVUS OF RIGHT UPPER EXTREMITY: Status: ACTIVE | Noted: 2022-05-23

## 2025-05-21 PROBLEM — I78.9 CAPILLARY DISEASE: Status: ACTIVE | Noted: 2025-04-28

## 2025-05-21 PROBLEM — Z85.828 HISTORY OF MALIGNANT NEOPLASM OF SKIN: Status: ACTIVE | Noted: 2021-05-03

## 2025-05-21 PROBLEM — L23.1 ALLERGIC CONTACT DERMATITIS DUE TO ADHESIVES: Status: ACTIVE | Noted: 2025-05-12

## 2025-05-21 PROBLEM — D22.72 MELANOCYTIC NEVUS OF LEFT LOWER EXTREMITY: Status: ACTIVE | Noted: 2023-04-24

## 2025-05-21 PROBLEM — L91.9 HYPERTROPHIC CONDITION OF SKIN: Status: ACTIVE | Noted: 2022-03-31

## 2025-05-21 PROBLEM — L57.9 SKIN CHANGES DUE TO CHRONIC EXPOSURE TO NONIONIZING RADIATION: Status: ACTIVE | Noted: 2021-05-03

## 2025-05-21 PROBLEM — Z86.19 HISTORY OF INFECTIOUS DISEASE: Status: ACTIVE | Noted: 2023-10-17

## 2025-05-21 PROBLEM — L81.9 DISORDER OF PIGMENTATION: Status: ACTIVE | Noted: 2023-04-24

## 2025-05-21 RX ORDER — TRIAMCINOLONE ACETONIDE 1 MG/G
0.1 OINTMENT TOPICAL
COMMUNITY
Start: 2025-05-11

## 2025-06-09 ENCOUNTER — OFFICE VISIT (OUTPATIENT)
Dept: FAMILY MEDICINE | Facility: CLINIC | Age: 40
End: 2025-06-09
Payer: COMMERCIAL

## 2025-06-09 VITALS
RESPIRATION RATE: 12 BRPM | HEART RATE: 51 BPM | OXYGEN SATURATION: 98 % | SYSTOLIC BLOOD PRESSURE: 138 MMHG | WEIGHT: 230.13 LBS | TEMPERATURE: 97.2 F | HEIGHT: 71 IN | DIASTOLIC BLOOD PRESSURE: 80 MMHG | BODY MASS INDEX: 32.22 KG/M2

## 2025-06-09 DIAGNOSIS — Z00.00 ROUTINE GENERAL MEDICAL EXAMINATION AT A HEALTH CARE FACILITY: Primary | ICD-10-CM

## 2025-06-09 DIAGNOSIS — K21.9 GASTROESOPHAGEAL REFLUX DISEASE WITHOUT ESOPHAGITIS: ICD-10-CM

## 2025-06-09 LAB
ALBUMIN SERPL BCG-MCNC: 4.7 G/DL (ref 3.5–5.2)
ALP SERPL-CCNC: 52 U/L (ref 40–150)
ALT SERPL W P-5'-P-CCNC: 18 U/L (ref 0–70)
ANION GAP SERPL CALCULATED.3IONS-SCNC: 13 MMOL/L (ref 7–15)
AST SERPL W P-5'-P-CCNC: 26 U/L (ref 0–45)
BILIRUB SERPL-MCNC: 1.2 MG/DL
BUN SERPL-MCNC: 11.2 MG/DL (ref 6–20)
CALCIUM SERPL-MCNC: 9.4 MG/DL (ref 8.8–10.4)
CHLORIDE SERPL-SCNC: 102 MMOL/L (ref 98–107)
CHOLEST SERPL-MCNC: 150 MG/DL
CREAT SERPL-MCNC: 0.92 MG/DL (ref 0.67–1.17)
EGFRCR SERPLBLD CKD-EPI 2021: >90 ML/MIN/1.73M2
ERYTHROCYTE [DISTWIDTH] IN BLOOD BY AUTOMATED COUNT: 12.2 % (ref 10–15)
FASTING STATUS PATIENT QL REPORTED: YES
FASTING STATUS PATIENT QL REPORTED: YES
GLUCOSE SERPL-MCNC: 99 MG/DL (ref 70–99)
HCO3 SERPL-SCNC: 26 MMOL/L (ref 22–29)
HCT VFR BLD AUTO: 42.1 % (ref 40–53)
HDLC SERPL-MCNC: 41 MG/DL
HGB BLD-MCNC: 14 G/DL (ref 13.3–17.7)
LDLC SERPL CALC-MCNC: 98 MG/DL
MCH RBC QN AUTO: 29.4 PG (ref 26.5–33)
MCHC RBC AUTO-ENTMCNC: 33.3 G/DL (ref 31.5–36.5)
MCV RBC AUTO: 88 FL (ref 78–100)
NONHDLC SERPL-MCNC: 109 MG/DL
PLATELET # BLD AUTO: 156 10E3/UL (ref 150–450)
POTASSIUM SERPL-SCNC: 4.1 MMOL/L (ref 3.4–5.3)
PROT SERPL-MCNC: 7.2 G/DL (ref 6.4–8.3)
RBC # BLD AUTO: 4.76 10E6/UL (ref 4.4–5.9)
SODIUM SERPL-SCNC: 141 MMOL/L (ref 135–145)
TRIGL SERPL-MCNC: 57 MG/DL
WBC # BLD AUTO: 4.5 10E3/UL (ref 4–11)

## 2025-06-09 PROCEDURE — 3075F SYST BP GE 130 - 139MM HG: CPT | Performed by: FAMILY MEDICINE

## 2025-06-09 PROCEDURE — 36415 COLL VENOUS BLD VENIPUNCTURE: CPT | Performed by: FAMILY MEDICINE

## 2025-06-09 PROCEDURE — 99396 PREV VISIT EST AGE 40-64: CPT | Performed by: FAMILY MEDICINE

## 2025-06-09 PROCEDURE — 99213 OFFICE O/P EST LOW 20 MIN: CPT | Mod: 25 | Performed by: FAMILY MEDICINE

## 2025-06-09 PROCEDURE — 80061 LIPID PANEL: CPT | Performed by: FAMILY MEDICINE

## 2025-06-09 PROCEDURE — 85027 COMPLETE CBC AUTOMATED: CPT | Performed by: FAMILY MEDICINE

## 2025-06-09 PROCEDURE — 80053 COMPREHEN METABOLIC PANEL: CPT | Performed by: FAMILY MEDICINE

## 2025-06-09 PROCEDURE — 3079F DIAST BP 80-89 MM HG: CPT | Performed by: FAMILY MEDICINE

## 2025-06-09 RX ORDER — OMEPRAZOLE 20 MG/1
20 CAPSULE, DELAYED RELEASE ORAL DAILY
Qty: 90 CAPSULE | Refills: 3 | Status: SHIPPED | OUTPATIENT
Start: 2025-06-09

## 2025-06-09 SDOH — HEALTH STABILITY: PHYSICAL HEALTH: ON AVERAGE, HOW MANY DAYS PER WEEK DO YOU ENGAGE IN MODERATE TO STRENUOUS EXERCISE (LIKE A BRISK WALK)?: 4 DAYS

## 2025-06-09 ASSESSMENT — SOCIAL DETERMINANTS OF HEALTH (SDOH): HOW OFTEN DO YOU GET TOGETHER WITH FRIENDS OR RELATIVES?: ONCE A WEEK

## 2025-06-09 NOTE — PROGRESS NOTES
"Preventive Care Visit  [facility] MELO Manrique MD, Family Medicine  Jun 9, 2025      Assessment & Plan     Routine general medical examination at a health care facility  - REVIEW OF HEALTH MAINTENANCE PROTOCOL ORDERS  - PRIMARY CARE FOLLOW-UP SCHEDULING; Future  - Lipid panel reflex to direct LDL Fasting; Future  - COMPREHENSIVE METABOLIC PANEL; Future  - CBC with Platelets; Future  - Lipid panel reflex to direct LDL Fasting  - COMPREHENSIVE METABOLIC PANEL  - CBC with Platelets    Gastroesophageal reflux disease without esophagitis  Refill -history of silent reflux  - omeprazole (PRILOSEC) 20 MG DR capsule; Take 1 capsule (20 mg) by mouth daily.    Patient has been advised of split billing requirements and indicates understanding: Yes        BMI  Estimated body mass index is 32.49 kg/m  as calculated from the following:    Height as of this encounter: 1.792 m (5' 10.57\").    Weight as of this encounter: 104.4 kg (230 lb 2 oz).   Weight management plan: Discussed healthy diet and exercise guidelines    Counseling  Appropriate preventive services were addressed with this patient via screening, questionnaire, or discussion as appropriate for fall prevention, nutrition, physical activity, Tobacco-use cessation, social engagement, weight loss and cognition.  Checklist reviewing preventive services available has been given to the patient.  Reviewed patient's diet, addressing concerns and/or questions.           Subjective   Brad is a 40 year old, presenting for the following:  Physical (Fasting for labs today)        6/9/2025    11:00 AM   Additional Questions   Roomed by Chelsy Morales CMA   Accompanied by self          HPI  Brad is overall doing well.  Him and his wife will be going to [place] next week for their anniversary trip.    He was able to come in and get his shingles vaccines so he is done with those.    No questions or concerns.  History of reflux and needs refill of " omeprazole.  States that often his reflux is silent but when he gets off of the omeprazole it tends to flare        Advance Care Planning    Discussed advance care planning with patient; however, patient declined at this time.        6/9/2025   General Health   How would you rate your overall physical health? Good   Feel stress (tense, anxious, or unable to sleep) Not at all         6/9/2025   Nutrition   Three or more servings of calcium each day? Yes   Diet: Regular (no restrictions)   How many servings of fruit and vegetables per day? (!) 0-1   How many sweetened beverages each day? 0-1         6/9/2025   Exercise   Days per week of moderate/strenous exercise 4 days         6/9/2025   Social Factors   Frequency of gathering with friends or relatives Once a week   Worry food won't last until get money to buy more No   Food not last or not have enough money for food? No   Do you have housing? (Housing is defined as stable permanent housing and does not include staying outside in a car, in a tent, in an abandoned building, in an overnight shelter, or couch-surfing.) Yes   Are you worried about losing your housing? No   Lack of transportation? No   Unable to get utilities (heat,electricity)? No         6/9/2025   Dental   Dentist two times every year? Yes         Today's PHQ-2 Score:       6/9/2025    10:46 AM   PHQ-2 ( 1999 [employer])   Q1: Little interest or pleasure in doing things 0   Q2: Feeling down, depressed or hopeless 0   PHQ-2 Score 0    Q1: Little interest or pleasure in doing things Not at all   Q2: Feeling down, depressed or hopeless Not at all   PHQ-2 Score 0       Patient-reported           6/9/2025   Substance Use   Alcohol more than 3/day or more than 7/wk No   Do you use any other substances recreationally? No     Social History     Tobacco Use    Smoking status: Former    Smokeless tobacco: Never   Vaping Use    Vaping status: Never Used           6/9/2025   STI Screening   New sexual partner(s)  "since last STI/HIV test? No   ASCVD Risk   The 10-year ASCVD risk score (Bao HOWELL, et al., 2019) is: 0.9%    Values used to calculate the score:      Age: 40 years      Sex: Male      Is Non- : No      Diabetic: No      Tobacco smoker: No      Systolic Blood Pressure: 138 mmHg      Is BP treated: No      HDL Cholesterol: 43 mg/dL      Total Cholesterol: 137 mg/dL        6/9/2025   Contraception/Family Planning   Questions about contraception or family planning No        Reviewed and updated as needed this visit by Provider                    Past Medical History:   Diagnosis Date    Anxiety     Asthma 10/2/2007    Formatting of this note might be different from the original. 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     Objective    Exam  /80   Pulse 51   Temp 97.2  F (36.2  C) (Tympanic)   Resp 12   Ht 1.792 m (5' 10.57\")   Wt 104.4 kg (230 lb 2 oz)   SpO2 98%   BMI 32.49 kg/m     Estimated body mass index is 32.49 kg/m  as calculated from the following:    Height as of this encounter: 1.792 m (5' 10.57\").    Weight as of this encounter: 104.4 kg (230 lb 2 oz).    Physical Exam  Objective:  Vital signs reviewed and stable  General: No acute distress  Psych: Appropriate affect  HEENT: moist mucous membranes, pupils equal, round, reactive to light and accomodation, tympanic membranes are pearly grey bilaterally  Lymph: no cervical or supraclavicular lymphadenopathy  Cardiovascular: regular rate and rhythm with no murmur  Pulmonary: clear to auscultation bilaterally with no wheeze  Abdomen: soft, non tender, non " distended with normo-active bowel sounds  Extremities: warm and well perfused with no edema  Skin: warm and dry with no rash         Signed Electronically by: Tenisha Manrique MD

## 2025-06-09 NOTE — PATIENT INSTRUCTIONS
Patient Education   Preventive Care Advice   This is general advice given by our system to help you stay healthy. However, your care team may have specific advice just for you. Please talk to your care team about your preventive care needs.  Nutrition  Eat 5 or more servings of fruits and vegetables each day.  Try wheat bread, brown rice and whole grain pasta (instead of white bread, rice, and pasta).  Get enough calcium and vitamin D. Check the label on foods and aim for 100% of the RDA (recommended daily allowance).  Lifestyle  Exercise at least 150 minutes each week  (30 minutes a day, 5 days a week).  Do muscle strengthening activities 2 days a week. These help control your weight and prevent disease.  No smoking.  Wear sunscreen to prevent skin cancer.  Have a dental exam and cleaning every 6 months.  Yearly exams  See your health care team every year to talk about:  Any changes in your health.  Any medicines your care team has prescribed.  Preventive care, family planning, and ways to prevent chronic diseases.  Shots (vaccines)   HPV shots (up to age 26), if you've never had them before.  Hepatitis B shots (up to age 59), if you've never had them before.  COVID-19 shot: Get this shot when it's due.  Flu shot: Get a flu shot every year.  Tetanus shot: Get a tetanus shot every 10 years.  Pneumococcal, hepatitis A, and RSV shots: Ask your care team if you need these based on your risk.  Shingles shot (for age 50 and up)  General health tests  Diabetes screening:  Starting at age 35, Get screened for diabetes at least every 3 years.  If you are younger than age 35, ask your care team if you should be screened for diabetes.  Cholesterol test: At age 39, start having a cholesterol test every 5 years, or more often if advised.  Bone density scan (DEXA): At age 50, ask your care team if you should have this scan for osteoporosis (brittle bones).  Hepatitis C: Get tested at least once in your life.  STIs (sexually  transmitted infections)  Before age 24: Ask your care team if you should be screened for STIs.  After age 24: Get screened for STIs if you're at risk. You are at risk for STIs (including HIV) if:  You are sexually active with more than one person.  You don't use condoms every time.  You or a partner was diagnosed with a sexually transmitted infection.  If you are at risk for HIV, ask about PrEP medicine to prevent HIV.  Get tested for HIV at least once in your life, whether you are at risk for HIV or not.  Cancer screening tests  Cervical cancer screening: If you have a cervix, begin getting regular cervical cancer screening tests starting at age 21.  Breast cancer scan (mammogram): If you've ever had breasts, begin having regular mammograms starting at age 40. This is a scan to check for breast cancer.  Colon cancer screening: It is important to start screening for colon cancer at age 45.  Have a colonoscopy test every 10 years (or more often if you're at risk) Or, ask your provider about stool tests like a FIT test every year or Cologuard test every 3 years.  To learn more about your testing options, visit:   .  For help making a decision, visit:   https://bit.ly/az41661.  Prostate cancer screening test: If you have a prostate, ask your care team if a prostate cancer screening test (PSA) at age 55 is right for you.  Lung cancer screening: If you are a current or former smoker ages 50 to 80, ask your care team if ongoing lung cancer screenings are right for you.  For informational purposes only. Not to replace the advice of your health care provider. Copyright   2023 [employer]. All rights reserved. Clinically reviewed by the [facility] Transitions Program. appbackr [id number] - REV 01/24.

## 2025-06-12 ENCOUNTER — RESULTS FOLLOW-UP (OUTPATIENT)
Dept: FAMILY MEDICINE | Facility: CLINIC | Age: 40
End: 2025-06-12